# Patient Record
Sex: FEMALE | Race: WHITE | Employment: OTHER | URBAN - METROPOLITAN AREA
[De-identification: names, ages, dates, MRNs, and addresses within clinical notes are randomized per-mention and may not be internally consistent; named-entity substitution may affect disease eponyms.]

---

## 2019-03-06 LAB — PAP SMEAR, EXTERNAL: NORMAL

## 2020-08-21 VITALS
DIASTOLIC BLOOD PRESSURE: 68 MMHG | HEIGHT: 63 IN | BODY MASS INDEX: 30.83 KG/M2 | SYSTOLIC BLOOD PRESSURE: 118 MMHG | WEIGHT: 174 LBS

## 2020-08-21 PROBLEM — E78.00 HYPERCHOLESTEROLEMIA: Status: ACTIVE | Noted: 2020-08-21

## 2020-08-21 PROBLEM — Z78.0 MENOPAUSE: Status: ACTIVE | Noted: 2020-08-21

## 2020-08-21 PROBLEM — I10 HYPERTENSIVE DISORDER: Status: ACTIVE | Noted: 2020-08-21

## 2020-08-21 PROBLEM — R25.1 TREMOR: Status: ACTIVE | Noted: 2020-08-21

## 2020-08-21 PROBLEM — M85.80 OSTEOPENIA: Status: ACTIVE | Noted: 2020-08-21

## 2020-08-21 RX ORDER — FLUOCINONIDE 0.5 MG/G
CREAM TOPICAL 2 TIMES DAILY
COMMUNITY

## 2020-08-21 RX ORDER — ALPRAZOLAM 0.25 MG/1
TABLET ORAL
COMMUNITY

## 2020-08-21 RX ORDER — ESTRADIOL 1 MG/1
1 TABLET ORAL DAILY
COMMUNITY
End: 2021-06-18

## 2020-08-21 RX ORDER — BACLOFEN 10 MG/1
TABLET ORAL 3 TIMES DAILY
COMMUNITY
End: 2021-06-29 | Stop reason: ALTCHOICE

## 2020-08-21 RX ORDER — PRIMIDONE 50 MG/1
TABLET ORAL 3 TIMES DAILY
COMMUNITY

## 2020-08-21 RX ORDER — MUPIROCIN CALCIUM 20 MG/G
CREAM TOPICAL 2 TIMES DAILY
COMMUNITY
End: 2020-10-06

## 2020-08-21 RX ORDER — OFLOXACIN 3 MG/ML
3 SOLUTION/ DROPS OPHTHALMIC 4 TIMES DAILY
COMMUNITY
End: 2021-06-29 | Stop reason: ALTCHOICE

## 2020-08-21 RX ORDER — METRONIDAZOLE 500 MG/1
TABLET ORAL 3 TIMES DAILY
COMMUNITY
End: 2021-06-29 | Stop reason: ALTCHOICE

## 2020-08-21 RX ORDER — PROPRANOLOL HYDROCHLORIDE 10 MG/1
TABLET ORAL 3 TIMES DAILY
COMMUNITY

## 2020-08-21 RX ORDER — TRIAMTERENE/HYDROCHLOROTHIAZID 37.5-25 MG
TABLET ORAL DAILY
COMMUNITY

## 2020-09-16 ENCOUNTER — OFFICE VISIT (OUTPATIENT)
Dept: PODIATRY | Age: 75
End: 2020-09-16
Payer: MEDICARE

## 2020-09-16 VITALS
HEIGHT: 63 IN | SYSTOLIC BLOOD PRESSURE: 125 MMHG | WEIGHT: 174.8 LBS | HEART RATE: 84 BPM | OXYGEN SATURATION: 95 % | BODY MASS INDEX: 30.97 KG/M2 | TEMPERATURE: 98 F | DIASTOLIC BLOOD PRESSURE: 83 MMHG

## 2020-09-16 DIAGNOSIS — L03.032 PARONYCHIA OF GREAT TOE OF LEFT FOOT: Primary | ICD-10-CM

## 2020-09-16 PROCEDURE — G8536 NO DOC ELDER MAL SCRN: HCPCS | Performed by: PODIATRIST

## 2020-09-16 PROCEDURE — G8427 DOCREV CUR MEDS BY ELIG CLIN: HCPCS | Performed by: PODIATRIST

## 2020-09-16 PROCEDURE — G8400 PT W/DXA NO RESULTS DOC: HCPCS | Performed by: PODIATRIST

## 2020-09-16 PROCEDURE — 1101F PT FALLS ASSESS-DOCD LE1/YR: CPT | Performed by: PODIATRIST

## 2020-09-16 PROCEDURE — 11730 AVULSION NAIL PLATE SIMPLE 1: CPT | Performed by: PODIATRIST

## 2020-09-16 PROCEDURE — 3017F COLORECTAL CA SCREEN DOC REV: CPT | Performed by: PODIATRIST

## 2020-09-16 PROCEDURE — 1090F PRES/ABSN URINE INCON ASSESS: CPT | Performed by: PODIATRIST

## 2020-09-16 PROCEDURE — G8419 CALC BMI OUT NRM PARAM NOF/U: HCPCS | Performed by: PODIATRIST

## 2020-09-16 PROCEDURE — G8432 DEP SCR NOT DOC, RNG: HCPCS | Performed by: PODIATRIST

## 2020-09-16 PROCEDURE — 99203 OFFICE O/P NEW LOW 30 MIN: CPT | Performed by: PODIATRIST

## 2020-09-16 RX ORDER — FLUTICASONE PROPIONATE 50 MCG
2 SPRAY, SUSPENSION (ML) NASAL DAILY
COMMUNITY

## 2020-09-16 RX ORDER — LORATADINE 10 MG/1
10 TABLET ORAL
COMMUNITY

## 2020-09-16 RX ORDER — CALCIUM CARBONATE/VITAMIN D3 600 MG-125
TABLET ORAL
COMMUNITY

## 2020-09-16 RX ORDER — ASPIRIN 81 MG/1
TABLET ORAL DAILY
COMMUNITY

## 2020-09-16 NOTE — PROGRESS NOTES
Podiatry History and Physical    Subjective:         Patient is a 69 y.o. female who is being seen as a new pt for a possible infection to her left big toe. Patient states roughly 1 year ago she injured the toe while moving her refrigerator. She states that since, her nail has been growing improperly and she states there has been drainage since the injury. She currently admits to pain rising to the level of 5 out of 10 that is exacerbated with weightbearing and pressure. She states there is clear drainage with no malodor noted. She denies any systemic signs of infection. She states she is never been on antibiotics or had any procedures performed to the area. She denies any other lower extremity complaints    Past Medical History:   Diagnosis Date    Hypercholesterolemia 8/21/2020    Hypertensive disorder 8/21/2020    Lactose intolerance     Menopause 8/21/2020    Obesity     Osteopenia 8/21/2020    Tremor 8/21/2020     Past Surgical History:   Procedure Laterality Date    HX BREAST LUMPECTOMY      HX CHOLECYSTECTOMY      HX COLONOSCOPY      HX HYSTERECTOMY         Family History   Problem Relation Age of Onset    Heart Disease Mother     Hypertension Mother     Diabetes Sister       Social History     Tobacco Use    Smoking status: Former Smoker     Packs/day: 0.50     Years: 20.00     Pack years: 10.00    Smokeless tobacco: Never Used   Substance Use Topics    Alcohol use: Not on file     Allergies   Allergen Reactions    Codeine Unknown (comments)    Opioids - Morphine Analogues Unknown (comments)    Propoxyphene Unknown (comments)     Prior to Admission medications    Medication Sig Start Date End Date Taking? Authorizing Provider   aspirin delayed-release 81 mg tablet Take  by mouth daily. Yes Provider, Historical   calcium-cholecalciferol, d3, (CALCIUM 600 + D) 600-125 mg-unit tab Take  by mouth. Yes Provider, Historical   potassium 99 mg tablet Take 99 mg by mouth daily.    Yes Provider, Historical   loratadine (Claritin) 10 mg tablet Take 10 mg by mouth. Yes Provider, Historical   Lactobacillus acidophilus (PROBIOTIC ACIDOPHILUS PO) Take  by mouth. Yes Provider, Historical   fluticasone propionate (Flonase Allergy Relief) 50 mcg/actuation nasal spray 2 Sprays by Both Nostrils route daily. Yes Provider, Historical   ALPRAZolam (XANAX) 0.25 mg tablet Take  by mouth. Yes Provider, Historical   estradioL (ESTRACE) 1 mg tablet Take 1 mg by mouth daily. Yes Provider, Historical   primidone (MYSOLINE) 50 mg tablet Take  by mouth three (3) times daily. Yes Provider, Historical   propranoloL (INDERAL) 10 mg tablet Take  by mouth three (3) times daily. Yes Provider, Historical   triamterene-hydroCHLOROthiazide (MAXZIDE) 37.5-25 mg per tablet Take  by mouth daily. Yes Provider, Historical   baclofen (LIORESAL) 10 mg tablet Take  by mouth three (3) times daily. Provider, Historical   fluocinoNIDE (LIDEX) 0.05 % topical cream Apply  to affected area two (2) times a day. Provider, Historical   metroNIDAZOLE (FLAGYL) 500 mg tablet Take  by mouth three (3) times daily. Provider, Historical   mupirocin calcium (BACTROBAN) 2 % topical cream Apply  to affected area two (2) times a day. Provider, Historical   ofloxacin (FLOXIN) 0.3 % ophthalmic solution Administer 3 Drops to both eyes four (4) times daily. Provider, Historical       Review of Systems   Constitutional: Negative. HENT: Negative. Eyes: Negative. Respiratory: Negative. Cardiovascular: Negative. Gastrointestinal: Negative. Genitourinary: Negative. Musculoskeletal: Negative. Skin: Positive for wound. Allergic/Immunologic: Positive for environmental allergies. Neurological: Negative. Hematological: Negative. Psychiatric/Behavioral: Positive for dysphoric mood. All other systems reviewed and are negative.       Objective:     Visit Vitals  /83 (BP 1 Location: Left arm, BP Patient Position: Sitting)   Pulse 84   Temp 98 °F (36.7 °C) (Temporal)   Ht 5' 3\" (1.6 m)   Wt 174 lb 12.8 oz (79.3 kg)   SpO2 95%   BMI 30.96 kg/m²       Physical Exam  Vitals signs reviewed. Constitutional:       Appearance: She is obese. Cardiovascular:      Pulses:           Dorsalis pedis pulses are 2+ on the right side and 2+ on the left side. Posterior tibial pulses are 2+ on the right side and 2+ on the left side. Musculoskeletal:      Right lower leg: No edema. Left lower leg: No edema. Right foot: Normal range of motion. No deformity. Left foot: Decreased range of motion. Deformity present. Feet:      Right foot:      Protective Sensation: 10 sites tested. 10 sites sensed. Skin integrity: Skin integrity normal.      Toenail Condition: Right toenails are normal.      Left foot:      Protective Sensation: 10 sites tested. 10 sites sensed. Skin integrity: Ulcer and erythema present. Toenail Condition: Left toenails are ingrown. Skin:     General: Skin is warm. Capillary Refill: Capillary refill takes 2 to 3 seconds. Neurological:      Mental Status: She is alert and oriented to person, place, and time. Psychiatric:         Mood and Affect: Mood and affect normal.         Behavior: Behavior is cooperative. Data Review: No results found for this or any previous visit (from the past 24 hour(s)). Impression:       ICD-10-CM ICD-9-CM    1. Paronychia of great toe of left foot  L03.032 681.11          Recommendation:     Patient seen and evaluated in the office  Discussed and educated patient regarding her current medical condition. Instructed regarding treatment options and patient elected for a partial nail avulsion of the affected toenail  The left hallux was scrubbed and draped in sterile fashion and the offending nail border was removed without incident. No anesthesia was needed.   Patient tolerated well and appropriate dressing was applied  Patient instructed to perform Epsom salt and warm water soaks for 15 minutes each day  Patient to call the office immediately if condition worsens and will initiate antibiotic therapy        RTC in PRN

## 2020-09-24 ENCOUNTER — OFFICE VISIT (OUTPATIENT)
Dept: PODIATRY | Age: 75
End: 2020-09-24
Payer: MEDICARE

## 2020-09-24 VITALS
HEIGHT: 63 IN | WEIGHT: 175.8 LBS | TEMPERATURE: 97.1 F | HEART RATE: 91 BPM | DIASTOLIC BLOOD PRESSURE: 68 MMHG | SYSTOLIC BLOOD PRESSURE: 105 MMHG | BODY MASS INDEX: 31.15 KG/M2 | OXYGEN SATURATION: 98 %

## 2020-09-24 DIAGNOSIS — L03.032 PARONYCHIA OF GREAT TOE OF LEFT FOOT: Primary | ICD-10-CM

## 2020-09-24 PROCEDURE — 99213 OFFICE O/P EST LOW 20 MIN: CPT | Performed by: PODIATRIST

## 2020-09-24 RX ORDER — DOXYCYCLINE 100 MG/1
100 CAPSULE ORAL 2 TIMES DAILY
Qty: 20 CAP | Refills: 0 | Status: SHIPPED | OUTPATIENT
Start: 2020-09-24 | End: 2020-10-04

## 2020-09-24 NOTE — PROGRESS NOTES
Podiatry History and Physical    Subjective:         Patient is a 69 y.o. female who is being seen as a returning patient status post partial nail removal to the left big toe. Patient states she has been performing wound care as instructed. She states she has been soaking her foot as instructed. She admits to pain rising to the level of 3 out of 10 that is exacerbated with weightbearing and pressure. She states there is mild clear drainage with no malodor. She denies any systemic signs of infection. She denies any other lower extremity complaints    Past Medical History:   Diagnosis Date    Hypercholesterolemia 8/21/2020    Hypertensive disorder 8/21/2020    Lactose intolerance     Menopause 8/21/2020    Obesity     Osteopenia 8/21/2020    Tremor 8/21/2020     Past Surgical History:   Procedure Laterality Date    HX BREAST LUMPECTOMY      HX CHOLECYSTECTOMY      HX COLONOSCOPY      HX HYSTERECTOMY         Family History   Problem Relation Age of Onset    Heart Disease Mother     Hypertension Mother     Diabetes Sister       Social History     Tobacco Use    Smoking status: Former Smoker     Packs/day: 0.50     Years: 20.00     Pack years: 10.00    Smokeless tobacco: Never Used   Substance Use Topics    Alcohol use: Never     Frequency: Never     Allergies   Allergen Reactions    Codeine Unknown (comments)    Opioids - Morphine Analogues Unknown (comments)    Propoxyphene Unknown (comments)     Prior to Admission medications    Medication Sig Start Date End Date Taking? Authorizing Provider   aspirin delayed-release 81 mg tablet Take  by mouth daily. Yes Provider, Historical   calcium-cholecalciferol, d3, (CALCIUM 600 + D) 600-125 mg-unit tab Take  by mouth. Yes Provider, Historical   potassium 99 mg tablet Take 99 mg by mouth daily. Yes Provider, Historical   loratadine (Claritin) 10 mg tablet Take 10 mg by mouth.    Yes Provider, Historical   Lactobacillus acidophilus (PROBIOTIC ACIDOPHILUS PO) Take  by mouth. Yes Provider, Historical   fluticasone propionate (Flonase Allergy Relief) 50 mcg/actuation nasal spray 2 Sprays by Both Nostrils route daily. Yes Provider, Historical   ALPRAZolam (XANAX) 0.25 mg tablet Take  by mouth. Yes Provider, Historical   baclofen (LIORESAL) 10 mg tablet Take  by mouth three (3) times daily. Yes Provider, Historical   estradioL (ESTRACE) 1 mg tablet Take 1 mg by mouth daily. Yes Provider, Historical   fluocinoNIDE (LIDEX) 0.05 % topical cream Apply  to affected area two (2) times a day. Yes Provider, Historical   metroNIDAZOLE (FLAGYL) 500 mg tablet Take  by mouth three (3) times daily. Yes Provider, Historical   mupirocin calcium (BACTROBAN) 2 % topical cream Apply  to affected area two (2) times a day. Yes Provider, Historical   ofloxacin (FLOXIN) 0.3 % ophthalmic solution Administer 3 Drops to both eyes four (4) times daily. Yes Provider, Historical   primidone (MYSOLINE) 50 mg tablet Take  by mouth three (3) times daily. Yes Provider, Historical   propranoloL (INDERAL) 10 mg tablet Take  by mouth three (3) times daily. Yes Provider, Historical   triamterene-hydroCHLOROthiazide (MAXZIDE) 37.5-25 mg per tablet Take  by mouth daily. Yes Provider, Historical       Review of Systems   Constitutional: Negative. HENT: Negative. Eyes: Negative. Respiratory: Negative. Cardiovascular: Negative. Gastrointestinal: Negative. Genitourinary: Negative. Musculoskeletal: Negative. Skin: Positive for wound. Allergic/Immunologic: Positive for environmental allergies. Neurological: Negative. Hematological: Negative. Psychiatric/Behavioral: Positive for dysphoric mood. All other systems reviewed and are negative.       Objective:     Visit Vitals  /68 (BP 1 Location: Left arm, BP Patient Position: Sitting)   Pulse 91   Temp 97.1 °F (36.2 °C) (Temporal)   Ht 5' 3\" (1.6 m)   Wt 175 lb 12.8 oz (79.7 kg)   SpO2 98% BMI 31.14 kg/m²       Physical Exam  Vitals signs reviewed. Constitutional:       Appearance: She is obese. Cardiovascular:      Pulses:           Dorsalis pedis pulses are 2+ on the right side and 2+ on the left side. Posterior tibial pulses are 2+ on the right side and 2+ on the left side. Musculoskeletal:      Right lower leg: No edema. Left lower leg: No edema. Right foot: Normal range of motion. No deformity. Left foot: Decreased range of motion. Deformity present. Feet:      Right foot:      Protective Sensation: 10 sites tested. 10 sites sensed. Skin integrity: Skin integrity normal.      Toenail Condition: Right toenails are normal.      Left foot:      Protective Sensation: 10 sites tested. 10 sites sensed. Skin integrity: Ulcer and erythema present. Toenail Condition: Left toenails are ingrown. Skin:     General: Skin is warm. Capillary Refill: Capillary refill takes 2 to 3 seconds. Neurological:      Mental Status: She is alert and oriented to person, place, and time. Psychiatric:         Mood and Affect: Mood and affect normal.         Behavior: Behavior is cooperative. Data Review: No results found for this or any previous visit (from the past 24 hour(s)). Impression:     -Paronychia, left hallux    Recommendation:     Patient seen and evaluated in the office  Discussed and educated patient regarding her current medical condition. Wound care performed and updated orders given.   Patient given a prescription for doxycycline 100 mg to be taken twice daily for the next 10 days  Patient instructed to perform Epsom salt and warm water soaks for 15 minutes each day  Patient to call the office immediately if condition worsens and will initiate antibiotic therapy        RTC in 1 week

## 2020-10-06 ENCOUNTER — TELEPHONE (OUTPATIENT)
Dept: PODIATRY | Age: 75
End: 2020-10-06

## 2020-10-06 ENCOUNTER — OFFICE VISIT (OUTPATIENT)
Dept: PRIMARY CARE CLINIC | Age: 75
End: 2020-10-06
Payer: MEDICARE

## 2020-10-06 VITALS
HEART RATE: 98 BPM | OXYGEN SATURATION: 98 % | SYSTOLIC BLOOD PRESSURE: 112 MMHG | TEMPERATURE: 97.4 F | DIASTOLIC BLOOD PRESSURE: 64 MMHG

## 2020-10-06 DIAGNOSIS — L03.032 INFECTION OF NAIL BED OF TOE OF LEFT FOOT: ICD-10-CM

## 2020-10-06 DIAGNOSIS — Z13.83 SCREENING FOR CARDIOVASCULAR, RESPIRATORY, AND GENITOURINARY DISEASES: Primary | ICD-10-CM

## 2020-10-06 DIAGNOSIS — Z13.6 SCREENING FOR CARDIOVASCULAR, RESPIRATORY, AND GENITOURINARY DISEASES: Primary | ICD-10-CM

## 2020-10-06 DIAGNOSIS — R06.02 SHORTNESS OF BREATH: ICD-10-CM

## 2020-10-06 DIAGNOSIS — Z13.89 SCREENING FOR CARDIOVASCULAR, RESPIRATORY, AND GENITOURINARY DISEASES: Primary | ICD-10-CM

## 2020-10-06 DIAGNOSIS — R05.9 COUGH: ICD-10-CM

## 2020-10-06 PROCEDURE — 99213 OFFICE O/P EST LOW 20 MIN: CPT | Performed by: NURSE PRACTITIONER

## 2020-10-06 RX ORDER — MUPIROCIN 20 MG/G
OINTMENT TOPICAL
Qty: 22 G | Refills: 0 | Status: SHIPPED | OUTPATIENT
Start: 2020-10-06 | End: 2021-06-29 | Stop reason: ALTCHOICE

## 2020-10-06 NOTE — PROGRESS NOTES
Nikhil Barber is a 69 y.o. female who was seen in clinic today (10/6/2020) for an acute visit. Assessment/Plan:            * COVID-19 sample collected and submitted       * Patient given detailed CDC instructions contained within After Visit Summary    Diagnoses and all orders for this visit:    1. Screening for cardiovascular, respiratory, and genitourinary diseases  -     NOVEL CORONAVIRUS (COVID-19)    2. Cough    3. Shortness of breath    4. Infection of nail bed of toe of left foot  -     mupirocin (BACTROBAN) 2 % ointment; Apply to affected toe twice daily     advised continued soak of foot, application of abx cream to area what may be a mild localized infection, and/or a mix of the other cream she has been applying to the site. F/u with Dr. Magy Shane for any additional issues. Unlikely covid, probable allergic rhinitis, cont using flonase and f/u with pcp prn. Advised pt on CDC guidance, OTC medications for symptom management, red flags reviewed and should develop to seek emergency medical attn. Encouraged pt to maintain health through a balanced diet, high in fiber and plants;small freq meals if any nausea; staying well hydrated by drinking water or occ low sugar non carbonated beverages; reviewed importance of proper sleep habitus, 7-8hrs of rest; and emphasized moderate exercise 30 mins a day/150mins a week. Reviewed with her that COVID-19 pandemic is an evolving situation with rapidly changing recommendations & guidelines, continue to practice hand hygiene, social distancing, wearing of facial coverings. Regardless of testing results, should still follow CDC guidelines as there is a chance of a false negative, or symptoms may be due to a cold or flu which are also transmissible. Medical decisions are made based on the the best information available at the time.   Recommended she stay tuned for updates published by trusted sources and to advise your PCP of any unexpected changes in clinical condition     Subjective:   Elena Adams was seen today for Concern For COVID-19 (Coronavirus) (Patient in office with c/o new onset cough (2 days), chronic SOB, and chronic fatigue, as well as nasal congestion. No known COVID exposure. ); Follow-up; Cough; Shortness of Breath; Fatigue; and Nasal Congestion    She c/o of cough for past few days, it is dry intermittent and occurs randomly throughout the day, she believes it be due to allergies. She notes chronic SOB and fatigue. She is a former smoker 10pack years, her pulmonologist dx her with chronic bronchitis and advised her to lose weight. She denies a recent history/current: fever, loss of smell/taste, n/v/d, myalgia. She recently had some lesions removed from the great toe of her left foot. She was concerned about an infection, her podiatrist put her on doxycycline for 10 days and today is the last day. She was to f/u with him but due to new cough, was advised a negative covid 19 test first.  The toe seems to be improving in her opinion, and the mid foot pain she had resolved with abx. Its not bothering her, but she wanted to be sure it was healing correctly. Travel Screening     Question   Response    In the last month, have you been in contact with someone who was confirmed or suspected to have Coronavirus / COVID-19? No / Unsure    Have you had a COVID-19 viral test in the last 14 days? Do you have any of the following new or worsening symptoms? Cough; Shortness of breath;Weakness    Have you traveled internationally in the last month?   No      Travel History   Travel since 09/06/20     No documented travel since 09/06/20          ROS - Pertinent items are noted in HPI    Patient Active Problem List   Diagnosis Code    Hypercholesterolemia E78.00    Hypertensive disorder I10    Menopause Z78.0    Osteopenia M85.80    Tremor R25.1    Paronychia of great toe of left foot L03.032     Home Medications    Medication Sig Start Date End Date Taking? Authorizing Provider   mupirocin (BACTROBAN) 2 % ointment Apply to affected toe twice daily 10/6/20  Yes Devan Kothari NP   aspirin delayed-release 81 mg tablet Take  by mouth daily. Yes Provider, Historical   calcium-cholecalciferol, d3, (CALCIUM 600 + D) 600-125 mg-unit tab Take  by mouth. Yes Provider, Historical   potassium 99 mg tablet Take 99 mg by mouth daily. Yes Provider, Historical   loratadine (Claritin) 10 mg tablet Take 10 mg by mouth. Yes Provider, Historical   Lactobacillus acidophilus (PROBIOTIC ACIDOPHILUS PO) Take  by mouth. Yes Provider, Historical   ALPRAZolam (XANAX) 0.25 mg tablet Take  by mouth. Yes Provider, Historical   estradioL (ESTRACE) 1 mg tablet Take 1 mg by mouth daily. Yes Provider, Historical   primidone (MYSOLINE) 50 mg tablet Take  by mouth three (3) times daily. Yes Provider, Historical   propranoloL (INDERAL) 10 mg tablet Take  by mouth three (3) times daily. Yes Provider, Historical   fluticasone propionate (Flonase Allergy Relief) 50 mcg/actuation nasal spray 2 Sprays by Both Nostrils route daily. Provider, Historical   baclofen (LIORESAL) 10 mg tablet Take  by mouth three (3) times daily. Provider, Historical   fluocinoNIDE (LIDEX) 0.05 % topical cream Apply  to affected area two (2) times a day. Provider, Historical   metroNIDAZOLE (FLAGYL) 500 mg tablet Take  by mouth three (3) times daily. Provider, Historical   ofloxacin (FLOXIN) 0.3 % ophthalmic solution Administer 3 Drops to both eyes four (4) times daily. Provider, Historical   triamterene-hydroCHLOROthiazide (MAXZIDE) 37.5-25 mg per tablet Take  by mouth daily. Provider, Historical   mupirocin calcium (BACTROBAN) 2 % topical cream Apply  to affected area two (2) times a day.   10/6/20  Provider, Historical      Allergies   Allergen Reactions    Codeine Unknown (comments)    Opioids - Morphine Analogues Unknown (comments)    Propoxyphene Unknown (comments) Objective:   Physical Exam  Musculoskeletal:        Feet:    Feet:      Comments: Distal to the eponychium of the left great toe is a 3-5mm opening in the nail, there is scant purulent drainage likely mixed with silver cream pt has been applying, doesn't appear to be any tunneling or eschar. The toe is mildly erythematous, is not swollen, no red streaking, no TTP, warm but not hot to touch. General:  obese, alert, cooperative, mild sob   Ears: Normal external ear canals AU   Sinuses: Normal paranasal sinuses without tenderness   Mouth:  Lips, mucosa, and tongue normal. Teeth and gums normal: oropharynx: clear   Neck: supple, symmetrical, trachea midline. Heart: normal rate, regular rhythm, normal S1, S2, no murmurs, rubs, clicks or gallops. Lungs: clear to auscultation bilaterally, appears slightly dyspneic       Visit Vitals  /64 (BP 1 Location: Left arm, BP Patient Position: Sitting)   Pulse 98   Temp 97.4 °F (36.3 °C)   SpO2 98%         Disclaimer:        Medication risks/benefits/costs/interactions/alternatives discussed with patient. She was given an after visit summary which includes diagnoses, current medications, & vitals. She expressed understanding with the diagnosis and plan. Aspects of this note may have been generated using voice recognition software. Despite editing, there may be some syntax errors.        Rachana Gutiérrez NP

## 2020-10-06 NOTE — TELEPHONE ENCOUNTER
The pt called earlier and spoke to [place] about having possible COVID symptoms but also a possible infection in her toe too from where he took off her nail at her last visit. I called the pt back and spoke to her about her COVID symptoms. She states that she has been really tired the last two weeks hardly able t get out of bed. She had no fever or SOB but she was experiencing a cough. She states that she called her PCP this morning, Dr. Irene Torre and that he prescribed her a cough medication, an antibiotic, and told her that she should get COVID tested. Pt didn't know where she could go besides [facility] but they wanted her to make an appointment. I also asked her about her toe. She says that it is still bleeding and that she was soaking it like the doctor had told her too. There were no signs of pus or odor coming from the site. I discussed the issue with Dr. Lee Gillette and our practice manager, Ivette Brand. It was decided that she should go to the flu clinic across the street from us and after she had been tested, Dr. Lee Gillette would go over there to see her toe.

## 2020-10-07 LAB — SARS-COV-2, NAA: NOT DETECTED

## 2020-10-09 ENCOUNTER — TELEPHONE (OUTPATIENT)
Dept: PRIMARY CARE CLINIC | Age: 75
End: 2020-10-09

## 2020-10-26 ENCOUNTER — OFFICE VISIT (OUTPATIENT)
Dept: PODIATRY | Age: 75
End: 2020-10-26
Payer: MEDICARE

## 2020-10-26 VITALS
OXYGEN SATURATION: 97 % | HEART RATE: 90 BPM | HEIGHT: 63 IN | SYSTOLIC BLOOD PRESSURE: 127 MMHG | WEIGHT: 178.8 LBS | TEMPERATURE: 97.5 F | BODY MASS INDEX: 31.68 KG/M2 | DIASTOLIC BLOOD PRESSURE: 86 MMHG

## 2020-10-26 DIAGNOSIS — L03.032 PARONYCHIA OF GREAT TOE OF LEFT FOOT: Primary | ICD-10-CM

## 2020-10-26 PROCEDURE — 99212 OFFICE O/P EST SF 10 MIN: CPT | Performed by: PODIATRIST

## 2020-10-26 RX ORDER — BENZONATATE 100 MG/1
100 CAPSULE ORAL
COMMUNITY
End: 2021-06-29 | Stop reason: ALTCHOICE

## 2020-10-29 NOTE — PROGRESS NOTES
Podiatry History and Physical    Subjective:         Patient is a 77 y.o. female who is being seen as a returning patient status post partial nail removal to the left big toe. Patient states since her last visit she presented for evaluation regarding a possible COVID-19 infection. She states the physician gave her oral antibiotics which she is continued to take. She states she has noticed improvement in the erythema noted to the left big toe. She currently denies any drainage or pain. She denies any recent trauma. She denies any systemic signs infection. She denies any other pedal complaints    Past Medical History:   Diagnosis Date    Hypercholesterolemia 2020    Hypertensive disorder 2020    Lactose intolerance     Menopause 2020    Obesity     Osteopenia 2020    Tremor 2020     Past Surgical History:   Procedure Laterality Date    HX BREAST LUMPECTOMY      HX CHOLECYSTECTOMY      HX COLONOSCOPY      HX HYSTERECTOMY         Family History   Problem Relation Age of Onset    Heart Disease Mother     Hypertension Mother     Diabetes Sister       Social History     Tobacco Use    Smoking status: Former Smoker     Packs/day: 0.50     Years: 20.00     Pack years: 10.00     Last attempt to quit: 1997     Years since quittin.8    Smokeless tobacco: Never Used   Substance Use Topics    Alcohol use: Never     Frequency: Never     Allergies   Allergen Reactions    Codeine Unknown (comments)    Opioids - Morphine Analogues Unknown (comments)    Propoxyphene Unknown (comments)     Prior to Admission medications    Medication Sig Start Date End Date Taking? Authorizing Provider   benzonatate (TESSALON) 100 mg capsule Take 100 mg by mouth three (3) times daily as needed for Cough. Yes Provider, Historical   mupirocin (BACTROBAN) 2 % ointment Apply to affected toe twice daily 10/6/20  Yes Teagan Cope NP   aspirin delayed-release 81 mg tablet Take  by mouth daily. Yes Provider, Historical   calcium-cholecalciferol, d3, (CALCIUM 600 + D) 600-125 mg-unit tab Take  by mouth. Yes Provider, Historical   potassium 99 mg tablet Take 99 mg by mouth daily. Yes Provider, Historical   loratadine (Claritin) 10 mg tablet Take 10 mg by mouth. Yes Provider, Historical   Lactobacillus acidophilus (PROBIOTIC ACIDOPHILUS PO) Take  by mouth. Yes Provider, Historical   fluticasone propionate (Flonase Allergy Relief) 50 mcg/actuation nasal spray 2 Sprays by Both Nostrils route daily. Yes Provider, Historical   ALPRAZolam (XANAX) 0.25 mg tablet Take  by mouth. Yes Provider, Historical   baclofen (LIORESAL) 10 mg tablet Take  by mouth three (3) times daily. Yes Provider, Historical   estradioL (ESTRACE) 1 mg tablet Take 1 mg by mouth daily. Yes Provider, Historical   fluocinoNIDE (LIDEX) 0.05 % topical cream Apply  to affected area two (2) times a day. Yes Provider, Historical   metroNIDAZOLE (FLAGYL) 500 mg tablet Take  by mouth three (3) times daily. Yes Provider, Historical   ofloxacin (FLOXIN) 0.3 % ophthalmic solution Administer 3 Drops to both eyes four (4) times daily. Yes Provider, Historical   primidone (MYSOLINE) 50 mg tablet Take  by mouth three (3) times daily. Yes Provider, Historical   propranoloL (INDERAL) 10 mg tablet Take  by mouth three (3) times daily. Yes Provider, Historical   triamterene-hydroCHLOROthiazide (MAXZIDE) 37.5-25 mg per tablet Take  by mouth daily. Yes Provider, Historical       Review of Systems   Constitutional: Negative. HENT: Negative. Eyes: Negative. Respiratory: Negative. Cardiovascular: Negative. Gastrointestinal: Negative. Genitourinary: Negative. Musculoskeletal: Negative. Skin: Positive for wound. Allergic/Immunologic: Positive for environmental allergies. Neurological: Negative. Hematological: Negative. Psychiatric/Behavioral: Positive for dysphoric mood.    All other systems reviewed and are negative. Objective:     Visit Vitals  /86 (BP 1 Location: Right arm, BP Patient Position: Sitting)   Pulse 90   Temp 97.5 °F (36.4 °C) (Temporal)   Ht 5' 3\" (1.6 m)   Wt 178 lb 12.8 oz (81.1 kg)   SpO2 97%   BMI 31.67 kg/m²       Physical Exam  Vitals signs reviewed. Constitutional:       Appearance: She is obese. Cardiovascular:      Pulses:           Dorsalis pedis pulses are 2+ on the right side and 2+ on the left side. Posterior tibial pulses are 2+ on the right side and 2+ on the left side. Pulmonary:      Effort: Pulmonary effort is normal.   Musculoskeletal:      Right lower leg: No edema. Left lower leg: No edema. Right foot: Normal range of motion. No deformity. Left foot: Decreased range of motion. Deformity present. Feet:      Right foot:      Protective Sensation: 10 sites tested. 10 sites sensed. Skin integrity: Skin integrity normal.      Toenail Condition: Right toenails are normal.      Left foot:      Protective Sensation: 10 sites tested. 10 sites sensed. Skin integrity: Erythema present. Toenail Condition: Left toenails are normal.   Lymphadenopathy:      Lower Body: No right inguinal adenopathy. No left inguinal adenopathy. Skin:     General: Skin is warm. Capillary Refill: Capillary refill takes 2 to 3 seconds. Neurological:      Mental Status: She is alert and oriented to person, place, and time. Psychiatric:         Mood and Affect: Mood and affect normal.         Behavior: Behavior is cooperative. Data Review: No results found for this or any previous visit (from the past 24 hour(s)). Impression:     - Paronychia, left hallux    Recommendation:     Patient seen and evaluated in the office  Discussed and educated patient regarding her current medical condition. Area appears to have healed.   Patient to complete her oral antibiotics and continue with Epsom salt and warm water soaks for 15 minutes each day for the next 7 days.   Patient to monitor and call the office immediately if condition worsens

## 2021-02-08 ENCOUNTER — OFFICE VISIT (OUTPATIENT)
Dept: PODIATRY | Age: 76
End: 2021-02-08
Payer: MEDICARE

## 2021-02-08 VITALS
HEART RATE: 87 BPM | OXYGEN SATURATION: 99 % | TEMPERATURE: 96.8 F | SYSTOLIC BLOOD PRESSURE: 131 MMHG | DIASTOLIC BLOOD PRESSURE: 90 MMHG | BODY MASS INDEX: 31.47 KG/M2 | HEIGHT: 63 IN | WEIGHT: 177.6 LBS

## 2021-02-08 DIAGNOSIS — L60.0 INGROWN TOENAIL OF LEFT FOOT: Primary | ICD-10-CM

## 2021-02-08 PROCEDURE — G8400 PT W/DXA NO RESULTS DOC: HCPCS | Performed by: PODIATRIST

## 2021-02-08 PROCEDURE — G8752 SYS BP LESS 140: HCPCS | Performed by: PODIATRIST

## 2021-02-08 PROCEDURE — G8417 CALC BMI ABV UP PARAM F/U: HCPCS | Performed by: PODIATRIST

## 2021-02-08 PROCEDURE — G8755 DIAS BP > OR = 90: HCPCS | Performed by: PODIATRIST

## 2021-02-08 PROCEDURE — 3017F COLORECTAL CA SCREEN DOC REV: CPT | Performed by: PODIATRIST

## 2021-02-08 PROCEDURE — G8427 DOCREV CUR MEDS BY ELIG CLIN: HCPCS | Performed by: PODIATRIST

## 2021-02-08 PROCEDURE — G8432 DEP SCR NOT DOC, RNG: HCPCS | Performed by: PODIATRIST

## 2021-02-08 PROCEDURE — 99213 OFFICE O/P EST LOW 20 MIN: CPT | Performed by: PODIATRIST

## 2021-02-08 PROCEDURE — G8536 NO DOC ELDER MAL SCRN: HCPCS | Performed by: PODIATRIST

## 2021-02-08 PROCEDURE — 1090F PRES/ABSN URINE INCON ASSESS: CPT | Performed by: PODIATRIST

## 2021-02-08 PROCEDURE — 1101F PT FALLS ASSESS-DOCD LE1/YR: CPT | Performed by: PODIATRIST

## 2021-02-10 PROBLEM — L60.0 INGROWN TOENAIL OF LEFT FOOT: Status: ACTIVE | Noted: 2021-02-10

## 2021-02-10 NOTE — PROGRESS NOTES
Podiatry History and Physical    Subjective:         Patient is a 77 y.o. female who is being seen as a returning patient for cont pain to her left big toe. She states she tolerated the procedure during a previous office visit well but she believes the ingrown toenail has returned. States the pain rises to the level of 8 out of 10 is located to the medial nail border. She states there is redness to the toe without any overt signs of local infection. She denies any systemic signs infection. She denies any recent trauma. She denies any other pedal complaint    Past Medical History:   Diagnosis Date    Hypercholesterolemia 2020    Hypertensive disorder 2020    Lactose intolerance     Menopause 2020    Obesity     Osteopenia 2020    Tremor 2020     Past Surgical History:   Procedure Laterality Date    HX BREAST LUMPECTOMY      HX CHOLECYSTECTOMY      HX COLONOSCOPY      HX HYSTERECTOMY         Family History   Problem Relation Age of Onset    Heart Disease Mother     Hypertension Mother     Diabetes Sister       Social History     Tobacco Use    Smoking status: Former Smoker     Packs/day: 0.50     Years: 20.00     Pack years: 10.00     Quit date: 1997     Years since quittin.1    Smokeless tobacco: Never Used   Substance Use Topics    Alcohol use: Never     Frequency: Never     Allergies   Allergen Reactions    Codeine Unknown (comments)    Opioids - Morphine Analogues Unknown (comments)    Propoxyphene Unknown (comments)     Prior to Admission medications    Medication Sig Start Date End Date Taking? Authorizing Provider   benzonatate (TESSALON) 100 mg capsule Take 100 mg by mouth three (3) times daily as needed for Cough. Yes Provider, Historical   mupirocin (BACTROBAN) 2 % ointment Apply to affected toe twice daily 10/6/20  Yes Nina Gillis NP   aspirin delayed-release 81 mg tablet Take  by mouth daily.    Yes Provider, Historical calcium-cholecalciferol, d3, (CALCIUM 600 + D) 600-125 mg-unit tab Take  by mouth. Yes Provider, Historical   potassium 99 mg tablet Take 99 mg by mouth daily. Yes Provider, Historical   loratadine (Claritin) 10 mg tablet Take 10 mg by mouth. Yes Provider, Historical   Lactobacillus acidophilus (PROBIOTIC ACIDOPHILUS PO) Take  by mouth. Yes Provider, Historical   fluticasone propionate (Flonase Allergy Relief) 50 mcg/actuation nasal spray 2 Sprays by Both Nostrils route daily. Yes Provider, Historical   ALPRAZolam (XANAX) 0.25 mg tablet Take  by mouth. Yes Provider, Historical   baclofen (LIORESAL) 10 mg tablet Take  by mouth three (3) times daily. Yes Provider, Historical   estradioL (ESTRACE) 1 mg tablet Take 1 mg by mouth daily. Yes Provider, Historical   fluocinoNIDE (LIDEX) 0.05 % topical cream Apply  to affected area two (2) times a day. Yes Provider, Historical   metroNIDAZOLE (FLAGYL) 500 mg tablet Take  by mouth three (3) times daily. Yes Provider, Historical   ofloxacin (FLOXIN) 0.3 % ophthalmic solution Administer 3 Drops to both eyes four (4) times daily. Yes Provider, Historical   primidone (MYSOLINE) 50 mg tablet Take  by mouth three (3) times daily. Yes Provider, Historical   propranoloL (INDERAL) 10 mg tablet Take  by mouth three (3) times daily. Yes Provider, Historical   triamterene-hydroCHLOROthiazide (MAXZIDE) 37.5-25 mg per tablet Take  by mouth daily. Yes Provider, Historical       Review of Systems   Constitutional: Negative. HENT: Negative. Eyes: Negative. Respiratory: Negative. Cardiovascular: Negative. Gastrointestinal: Negative. Genitourinary: Negative. Musculoskeletal: Negative. Skin: Positive for wound. Allergic/Immunologic: Positive for environmental allergies. Neurological: Negative. Hematological: Negative. Psychiatric/Behavioral: Positive for dysphoric mood. All other systems reviewed and are negative.       Objective: Visit Vitals  BP (!) 131/90 (BP 1 Location: Left upper arm, BP Patient Position: Sitting, BP Cuff Size: Adult)   Pulse 87   Temp 96.8 °F (36 °C) (Temporal)   Ht 5' 3\" (1.6 m)   Wt 177 lb 9.6 oz (80.6 kg)   SpO2 99%   BMI 31.46 kg/m²       Physical Exam  Vitals signs reviewed. Constitutional:       Appearance: She is obese. Cardiovascular:      Pulses:           Dorsalis pedis pulses are 2+ on the right side and 2+ on the left side. Posterior tibial pulses are 2+ on the right side and 2+ on the left side. Pulmonary:      Effort: Pulmonary effort is normal.   Musculoskeletal:      Right lower leg: No edema. Left lower leg: No edema. Right foot: Normal range of motion. No deformity. Left foot: Decreased range of motion. Deformity present. Feet:      Right foot:      Protective Sensation: 10 sites tested. 10 sites sensed. Skin integrity: Skin integrity normal.      Toenail Condition: Right toenails are normal.      Left foot:      Protective Sensation: 10 sites tested. 10 sites sensed. Skin integrity: Erythema present. Toenail Condition: Left toenails are ingrown. Lymphadenopathy:      Lower Body: No right inguinal adenopathy. No left inguinal adenopathy. Skin:     General: Skin is warm. Capillary Refill: Capillary refill takes 2 to 3 seconds. Neurological:      Mental Status: She is alert and oriented to person, place, and time. Psychiatric:         Mood and Affect: Mood and affect normal.         Behavior: Behavior is cooperative. Data Review: No results found for this or any previous visit (from the past 24 hour(s)). Impression:     - Ingrown toenail, left hallux    Recommendation:     Patient seen and evaluated in the office  Discussed and educated patient regarding her current medical condition. A slant back procedure was performed to the medial nail border of the left hallux with a nail nipper without incident. Patient tolerated well. Instructed patient that she needed to perform Epsom salt and warm water soaks for 15 minutes each day to prevent the ingrown from returning. Also educated patient that she may need a more permanent procedure if the symptoms persist or return.   Patient verbalized understanding

## 2021-03-05 ENCOUNTER — TRANSCRIBE ORDER (OUTPATIENT)
Dept: SCHEDULING | Age: 76
End: 2021-03-05

## 2021-03-05 DIAGNOSIS — I25.10 CHRONIC CORONARY ARTERY DISEASE: Primary | ICD-10-CM

## 2021-03-17 ENCOUNTER — HOSPITAL ENCOUNTER (OUTPATIENT)
Dept: NUCLEAR MEDICINE | Age: 76
Discharge: HOME | End: 2021-03-17
Attending: INTERNAL MEDICINE
Payer: MEDICARE

## 2021-03-17 ENCOUNTER — HOSPITAL ENCOUNTER (OUTPATIENT)
Dept: NON INVASIVE DIAGNOSTICS | Age: 76
Discharge: HOME | End: 2021-03-17
Attending: INTERNAL MEDICINE
Payer: MEDICARE

## 2021-03-17 VITALS
BODY MASS INDEX: 31.36 KG/M2 | WEIGHT: 177 LBS | DIASTOLIC BLOOD PRESSURE: 90 MMHG | HEIGHT: 63 IN | SYSTOLIC BLOOD PRESSURE: 147 MMHG

## 2021-03-17 DIAGNOSIS — I25.10 CHRONIC CORONARY ARTERY DISEASE: ICD-10-CM

## 2021-03-17 PROCEDURE — 74011250636 HC RX REV CODE- 250/636

## 2021-03-17 PROCEDURE — 93017 CV STRESS TEST TRACING ONLY: CPT

## 2021-03-17 RX ORDER — AMINOPHYLLINE 25 MG/ML
INJECTION, SOLUTION INTRAVENOUS
Status: COMPLETED
Start: 2021-03-17 | End: 2021-03-17

## 2021-03-17 RX ADMIN — AMINOPHYLLINE 125 MG: 25 INJECTION, SOLUTION INTRAVENOUS at 09:30

## 2021-03-17 RX ADMIN — REGADENOSON 0.4 MG: 0.08 INJECTION, SOLUTION INTRAVENOUS at 09:27

## 2021-03-18 LAB
STRESS BASELINE DIAS BP: 90 MMHG
STRESS BASELINE HR: 83 BPM
STRESS BASELINE SYS BP: 147 MMHG
STRESS PERCENT HR ACHIEVED: 85 %
STRESS POST PEAK HR: 124 BPM
STRESS STAGE 1 DURATION: NORMAL MIN:SEC
STRESS STAGE 1 HR: 96 BPM
STRESS STAGE 2 BP: NORMAL MMHG
STRESS STAGE 2 DURATION: NORMAL MIN:SEC
STRESS STAGE 2 HR: 113 BPM
STRESS STAGE 3 BP: NORMAL MMHG
STRESS STAGE 3 DURATION: NORMAL MIN:SEC
STRESS STAGE 3 HR: 113 BPM
STRESS STAGE 4 DURATION: NORMAL MIN:SEC
STRESS STAGE 4 HR: 111 BPM
STRESS STAGE 5 BP: NORMAL MMHG
STRESS STAGE 5 DURATION: NORMAL MIN:SEC
STRESS STAGE 5 HR: 100 BPM
STRESS STAGE 6 DURATION: NORMAL MIN:SEC
STRESS STAGE 6 HR: 98 BPM
STRESS STAGE 7 BP: NORMAL MMHG
STRESS STAGE 7 DURATION: NORMAL MIN:SEC
STRESS STAGE 7 HR: 96 BPM
STRESS TARGET HR: 146 BPM

## 2021-03-22 ENCOUNTER — OFFICE VISIT (OUTPATIENT)
Dept: PODIATRY | Age: 76
End: 2021-03-22
Payer: MEDICARE

## 2021-03-22 VITALS
OXYGEN SATURATION: 99 % | HEIGHT: 63 IN | TEMPERATURE: 96.8 F | BODY MASS INDEX: 31.29 KG/M2 | SYSTOLIC BLOOD PRESSURE: 133 MMHG | HEART RATE: 87 BPM | DIASTOLIC BLOOD PRESSURE: 84 MMHG | WEIGHT: 176.6 LBS

## 2021-03-22 DIAGNOSIS — L60.0 INGROWN TOENAIL OF LEFT FOOT: Primary | ICD-10-CM

## 2021-03-22 PROCEDURE — G8400 PT W/DXA NO RESULTS DOC: HCPCS | Performed by: PODIATRIST

## 2021-03-22 PROCEDURE — G8536 NO DOC ELDER MAL SCRN: HCPCS | Performed by: PODIATRIST

## 2021-03-22 PROCEDURE — G8510 SCR DEP NEG, NO PLAN REQD: HCPCS | Performed by: PODIATRIST

## 2021-03-22 PROCEDURE — 99213 OFFICE O/P EST LOW 20 MIN: CPT | Performed by: PODIATRIST

## 2021-03-22 PROCEDURE — G8754 DIAS BP LESS 90: HCPCS | Performed by: PODIATRIST

## 2021-03-22 PROCEDURE — 3017F COLORECTAL CA SCREEN DOC REV: CPT | Performed by: PODIATRIST

## 2021-03-22 PROCEDURE — 1101F PT FALLS ASSESS-DOCD LE1/YR: CPT | Performed by: PODIATRIST

## 2021-03-22 PROCEDURE — G8417 CALC BMI ABV UP PARAM F/U: HCPCS | Performed by: PODIATRIST

## 2021-03-22 PROCEDURE — G8752 SYS BP LESS 140: HCPCS | Performed by: PODIATRIST

## 2021-03-22 PROCEDURE — 1090F PRES/ABSN URINE INCON ASSESS: CPT | Performed by: PODIATRIST

## 2021-03-22 PROCEDURE — G8427 DOCREV CUR MEDS BY ELIG CLIN: HCPCS | Performed by: PODIATRIST

## 2021-03-22 NOTE — PROGRESS NOTES
Chief Complaint   Patient presents with    Toe Pain     Pt states R great toe is hurting     1. Have you been to the ER, urgent care clinic since your last visit? Hospitalized since your last visit? Yes Reason for visit: Strss test/Friday    2. Have you seen or consulted any other health care providers outside of the [address] [place] [address] since your last visit? Include any pap smears or colon screening.  Yes Reason for visit: PCP  PCP-Dr Rocio Nieto

## 2021-03-22 NOTE — PROGRESS NOTES
Podiatry History and Physical    Subjective:         Patient is a 77 y.o. female who is being seen as a returning patient for cont pain to her left big toe. She states she tolerated the procedure during a previous office visit well but she believes the ingrown toenail has returned. States the pain rises to the level of 2 out of 10 is located to the medial nail border. She states there is redness to the toe without any overt signs of local infection. She denies any systemic signs infection. She denies any recent trauma. She denies any other pedal complaint    Past Medical History:   Diagnosis Date    Hypercholesterolemia 2020    Hypertensive disorder 2020    Lactose intolerance     Menopause 2020    Obesity     Osteopenia 2020    Tremor 2020     Past Surgical History:   Procedure Laterality Date    HX BREAST LUMPECTOMY      HX CHOLECYSTECTOMY      HX COLONOSCOPY      HX HYSTERECTOMY         Family History   Problem Relation Age of Onset    Heart Disease Mother     Hypertension Mother     Diabetes Sister       Social History     Tobacco Use    Smoking status: Former Smoker     Packs/day: 0.50     Years: 20.00     Pack years: 10.00     Quit date: 1997     Years since quittin.2    Smokeless tobacco: Never Used   Substance Use Topics    Alcohol use: Never     Frequency: Never     Allergies   Allergen Reactions    Codeine Unknown (comments)    Opioids - Morphine Analogues Unknown (comments)    Propoxyphene Unknown (comments)     Prior to Admission medications    Medication Sig Start Date End Date Taking? Authorizing Provider   benzonatate (TESSALON) 100 mg capsule Take 100 mg by mouth three (3) times daily as needed for Cough. Provider, Historical   mupirocin (BACTROBAN) 2 % ointment Apply to affected toe twice daily 10/6/20   Bhavana Waddell NP   aspirin delayed-release 81 mg tablet Take  by mouth daily.     Provider, Historical   calcium-cholecalciferol, d3, (CALCIUM 600 + D) 600-125 mg-unit tab Take  by mouth. Provider, Historical   potassium 99 mg tablet Take 99 mg by mouth daily. Provider, Historical   loratadine (Claritin) 10 mg tablet Take 10 mg by mouth. Provider, Historical   Lactobacillus acidophilus (PROBIOTIC ACIDOPHILUS PO) Take  by mouth. Provider, Historical   fluticasone propionate (Flonase Allergy Relief) 50 mcg/actuation nasal spray 2 Sprays by Both Nostrils route daily. Provider, Historical   ALPRAZolam (XANAX) 0.25 mg tablet Take  by mouth. Provider, Historical   baclofen (LIORESAL) 10 mg tablet Take  by mouth three (3) times daily. Provider, Historical   estradioL (ESTRACE) 1 mg tablet Take 1 mg by mouth daily. Provider, Historical   fluocinoNIDE (LIDEX) 0.05 % topical cream Apply  to affected area two (2) times a day. Provider, Historical   metroNIDAZOLE (FLAGYL) 500 mg tablet Take  by mouth three (3) times daily. Provider, Historical   ofloxacin (FLOXIN) 0.3 % ophthalmic solution Administer 3 Drops to both eyes four (4) times daily. Provider, Historical   primidone (MYSOLINE) 50 mg tablet Take  by mouth three (3) times daily. Provider, Historical   propranoloL (INDERAL) 10 mg tablet Take  by mouth three (3) times daily. Provider, Historical   triamterene-hydroCHLOROthiazide (MAXZIDE) 37.5-25 mg per tablet Take  by mouth daily. Provider, Historical       Review of Systems   Constitutional: Negative. HENT: Negative. Eyes: Negative. Respiratory: Negative. Cardiovascular: Negative. Gastrointestinal: Negative. Genitourinary: Negative. Musculoskeletal: Negative. Skin: Positive for wound. Allergic/Immunologic: Positive for environmental allergies. Neurological: Negative. Hematological: Negative. Psychiatric/Behavioral: Positive for dysphoric mood. All other systems reviewed and are negative.       Objective:     Visit Vitals  /84 (BP 1 Location: Right upper arm, BP Patient Position: Sitting, BP Cuff Size: Large adult)   Pulse 87   Temp 96.8 °F (36 °C) (Temporal)   Ht 5' 3\" (1.6 m)   Wt 176 lb 9.6 oz (80.1 kg)   SpO2 99%   BMI 31.28 kg/m²       Physical Exam  Vitals signs reviewed. Constitutional:       Appearance: She is obese. Cardiovascular:      Pulses:           Dorsalis pedis pulses are 2+ on the right side and 2+ on the left side. Posterior tibial pulses are 2+ on the right side and 2+ on the left side. Pulmonary:      Effort: Pulmonary effort is normal.   Musculoskeletal:      Right lower leg: No edema. Left lower leg: No edema. Right foot: Normal range of motion. No deformity. Left foot: Decreased range of motion. Deformity present. Feet:      Right foot:      Protective Sensation: 10 sites tested. 10 sites sensed. Skin integrity: Skin integrity normal.      Toenail Condition: Right toenails are normal.      Left foot:      Protective Sensation: 10 sites tested. 10 sites sensed. Skin integrity: Erythema present. Toenail Condition: Left toenails are ingrown. Lymphadenopathy:      Lower Body: No right inguinal adenopathy. No left inguinal adenopathy. Skin:     General: Skin is warm. Capillary Refill: Capillary refill takes 2 to 3 seconds. Neurological:      Mental Status: She is alert and oriented to person, place, and time. Psychiatric:         Mood and Affect: Mood and affect normal.         Behavior: Behavior is cooperative. Data Review: No results found for this or any previous visit (from the past 24 hour(s)). Impression:     - Ingrown toenail, left hallux    Recommendation:     Patient seen and evaluated in the office  Discussed and educated patient regarding her current medical condition. A slant back procedure was performed to the medial nail border of the left hallux with a nail nipper without incident. Patient tolerated well.   Instructed patient that she needed to perform Epsom salt and warm water soaks for 15 minutes each day to prevent the ingrown from returning. Also educated patient that she may need a more permanent procedure if the symptoms persist or return.   Patient verbalized understanding

## 2021-06-14 VITALS — BODY MASS INDEX: 31.28 KG/M2 | HEIGHT: 63 IN

## 2021-06-17 DIAGNOSIS — N95.1 MENOPAUSAL AND FEMALE CLIMACTERIC STATES: ICD-10-CM

## 2021-06-18 RX ORDER — ESTRADIOL 1 MG/1
TABLET ORAL
Qty: 90 TABLET | Refills: 3 | Status: SHIPPED | OUTPATIENT
Start: 2021-06-18 | End: 2021-06-29 | Stop reason: SDUPTHER

## 2021-06-29 ENCOUNTER — OFFICE VISIT (OUTPATIENT)
Dept: OBGYN CLINIC | Age: 76
End: 2021-06-29

## 2021-06-29 VITALS
SYSTOLIC BLOOD PRESSURE: 132 MMHG | WEIGHT: 176 LBS | BODY MASS INDEX: 31.18 KG/M2 | HEIGHT: 63 IN | DIASTOLIC BLOOD PRESSURE: 77 MMHG

## 2021-06-29 DIAGNOSIS — Z90.711 SCREENING FOR MALIGNANT NEOPLASM OF VAGINA AFTER PARTIAL HYSTERECTOMY: Primary | ICD-10-CM

## 2021-06-29 DIAGNOSIS — N95.1 MENOPAUSAL AND FEMALE CLIMACTERIC STATES: ICD-10-CM

## 2021-06-29 DIAGNOSIS — N95.1 MENOPAUSAL SYNDROME: ICD-10-CM

## 2021-06-29 DIAGNOSIS — Z12.72 SCREENING FOR MALIGNANT NEOPLASM OF VAGINA AFTER PARTIAL HYSTERECTOMY: Primary | ICD-10-CM

## 2021-06-29 PROCEDURE — G8536 NO DOC ELDER MAL SCRN: HCPCS | Performed by: OBSTETRICS & GYNECOLOGY

## 2021-06-29 PROCEDURE — G8427 DOCREV CUR MEDS BY ELIG CLIN: HCPCS | Performed by: OBSTETRICS & GYNECOLOGY

## 2021-06-29 PROCEDURE — G8510 SCR DEP NEG, NO PLAN REQD: HCPCS | Performed by: OBSTETRICS & GYNECOLOGY

## 2021-06-29 PROCEDURE — G0101 CA SCREEN;PELVIC/BREAST EXAM: HCPCS | Performed by: OBSTETRICS & GYNECOLOGY

## 2021-06-29 PROCEDURE — G8417 CALC BMI ABV UP PARAM F/U: HCPCS | Performed by: OBSTETRICS & GYNECOLOGY

## 2021-06-29 RX ORDER — ESTRADIOL 1 MG/1
1 TABLET ORAL DAILY
Qty: 90 TABLET | Refills: 3 | Status: SHIPPED | OUTPATIENT
Start: 2021-06-29 | End: 2022-06-24

## 2021-06-29 NOTE — PROGRESS NOTES
Lashon Class is a , 77 y.o. female   No LMP recorded. Patient has had a hysterectomy. She presents for her annual    She is having no significant problems. Menstrual status:  Cycles are hysterectomy. Flow: absent. She does not have dysmenorrhea. Medical conditions:  Since her last annual GYN exam about one year ago, she has not the following changes in her health history: none. Mammogram History:    ROXANNE Results (most recent):  No results found for this or any previous visit. DEXA Results (most recent):  No results found for this or any previous visit. Past Medical History:   Diagnosis Date    Hypercholesterolemia 2020    Hypertensive disorder 2020    Lactose intolerance     Menopause 2020    Obesity     Osteopenia 2020    Tremor 2020     Past Surgical History:   Procedure Laterality Date    HX BREAST LUMPECTOMY      HX CHOLECYSTECTOMY      HX COLONOSCOPY      HX CYST INCISION AND DRAINAGE      HX GI  2016    Colonoscopy    HX HYSTERECTOMY         Prior to Admission medications    Medication Sig Start Date End Date Taking? Authorizing Provider   estradioL (ESTRACE) 1 mg tablet Take 1 Tablet by mouth daily. Indications: \"change of life\" signs 21  Yes Kristin Yoon MD   aspirin delayed-release 81 mg tablet Take  by mouth daily. Yes Provider, Historical   calcium-cholecalciferol, d3, (CALCIUM 600 + D) 600-125 mg-unit tab Take  by mouth. Yes Provider, Historical   potassium 99 mg tablet Take 99 mg by mouth daily. Yes Provider, Historical   loratadine (Claritin) 10 mg tablet Take 10 mg by mouth. Yes Provider, Historical   Lactobacillus acidophilus (PROBIOTIC ACIDOPHILUS PO) Take  by mouth. Yes Provider, Historical   fluticasone propionate (Flonase Allergy Relief) 50 mcg/actuation nasal spray 2 Sprays by Both Nostrils route daily. Yes Provider, Historical   ALPRAZolam (XANAX) 0.25 mg tablet Take  by mouth.    Yes Provider, Historical   fluocinoNIDE (LIDEX) 0.05 % topical cream Apply  to affected area two (2) times a day. Yes Provider, Historical   primidone (MYSOLINE) 50 mg tablet Take  by mouth three (3) times daily. Yes Provider, Historical   propranoloL (INDERAL) 10 mg tablet Take  by mouth three (3) times daily. Yes Provider, Historical   triamterene-hydroCHLOROthiazide (MAXZIDE) 37.5-25 mg per tablet Take  by mouth daily. Yes Provider, Historical       Allergies   Allergen Reactions    Codeine Rash    Opioids - Morphine Analogues Rash    Propoxyphene Rash          Tobacco History:  reports that she quit smoking about 24 years ago. She has a 10.00 pack-year smoking history. She has never used smokeless tobacco.  Alcohol Abuse:  reports no history of alcohol use. Drug Abuse:  reports no history of drug use. Family Medical/Cancer History:   Family History   Problem Relation Age of Onset    Heart Disease Mother     Hypertension Mother     Diabetes Sister           Review of Systems   Constitutional: Negative for chills, fever, malaise/fatigue and weight loss. HENT: Negative for congestion, ear pain, sinus pain and tinnitus. Eyes: Negative for blurred vision and double vision. Respiratory: Negative for cough, shortness of breath and wheezing. Cardiovascular: Negative for chest pain and palpitations. Gastrointestinal: Negative for abdominal pain, blood in stool, constipation, diarrhea, heartburn, nausea and vomiting. Genitourinary: Negative for dysuria, flank pain, frequency, hematuria and urgency. Musculoskeletal: Negative for joint pain and myalgias. Skin: Negative for itching and rash. Neurological: Negative for dizziness, weakness and headaches. Psychiatric/Behavioral: Negative for depression, memory loss and suicidal ideas. The patient is not nervous/anxious and does not have insomnia. Physical Exam  Constitutional:       Appearance: Normal appearance.    HENT:      Head: Normocephalic and atraumatic. Cardiovascular:      Rate and Rhythm: Normal rate. Heart sounds: Normal heart sounds. Pulmonary:      Effort: Pulmonary effort is normal.      Breath sounds: Normal breath sounds. Chest:      Breasts:         Right: Normal.         Left: Normal.   Abdominal:      General: Abdomen is flat. Palpations: Abdomen is soft. Genitourinary:     General: Normal vulva. Vagina: Normal.      Uterus: Absent. Adnexa: Right adnexa normal and left adnexa normal.      Rectum: Normal.      Comments: PAP Obtained  Neurological:      Mental Status: She is alert. Psychiatric:         Mood and Affect: Mood normal.         Behavior: Behavior normal.         Thought Content: Thought content normal.          Visit Vitals  /77 (BP 1 Location: Right arm, BP Patient Position: Sitting, BP Cuff Size: Small adult)   Ht 5' 3\" (1.6 m)   Wt 176 lb (79.8 kg)   BMI 31.18 kg/m²         Assessment:   Diagnoses and all orders for this visit:    1. Screening for malignant neoplasm of vagina after partial hysterectomy  -     PAP IG, RFX APTIMA HPV ASCUS ([id number])    2. Menopausal syndrome    3. Menopausal and female climacteric states  -     estradioL (ESTRACE) 1 mg tablet; Take 1 Tablet by mouth daily. Indications: \"change of life\" signs        Plan: Questions addressed  Counseled re: diet, exercise, healthy lifestyle  Return for Annual  Rec annual mammogram        Follow-up and Dispositions    · Return for 1 yr annual, 1 yr mammo.

## 2021-06-29 NOTE — PROGRESS NOTES
Chief Complaint   Patient presents with    Annual Exam     Mammo done here today, Colonoscopy-2016     Visit Vitals  /77 (BP 1 Location: Right arm, BP Patient Position: Sitting, BP Cuff Size: Small adult)   Ht 5' 3\" (1.6 m)   Wt 176 lb (79.8 kg)   BMI 31.18 kg/m²

## 2021-07-01 LAB
CYTOLOGIST CVX/VAG CYTO: NORMAL
CYTOLOGY CVX/VAG DOC CYTO: NORMAL
CYTOLOGY CVX/VAG DOC THIN PREP: NORMAL
DX ICD CODE: NORMAL
LABCORP, 190119: NORMAL
Lab: NORMAL
OTHER STN SPEC: NORMAL
STAT OF ADQ CVX/VAG CYTO-IMP: NORMAL

## 2022-03-18 PROBLEM — I10 HYPERTENSIVE DISORDER: Status: ACTIVE | Noted: 2020-08-21

## 2022-03-18 PROBLEM — L60.0 INGROWN TOENAIL OF LEFT FOOT: Status: ACTIVE | Noted: 2021-02-10

## 2022-03-18 PROBLEM — L03.032 PARONYCHIA OF GREAT TOE OF LEFT FOOT: Status: ACTIVE | Noted: 2020-09-16

## 2022-03-19 PROBLEM — M85.80 OSTEOPENIA: Status: ACTIVE | Noted: 2020-08-21

## 2022-03-19 PROBLEM — R25.1 TREMOR: Status: ACTIVE | Noted: 2020-08-21

## 2022-03-19 PROBLEM — Z78.0 MENOPAUSE: Status: ACTIVE | Noted: 2020-08-21

## 2022-03-20 PROBLEM — E78.00 HYPERCHOLESTEROLEMIA: Status: ACTIVE | Noted: 2020-08-21

## 2022-06-20 DIAGNOSIS — N95.1 MENOPAUSAL AND FEMALE CLIMACTERIC STATES: ICD-10-CM

## 2022-06-24 RX ORDER — ESTRADIOL 1 MG/1
1 TABLET ORAL DAILY
Qty: 90 TABLET | Refills: 3 | Status: SHIPPED | OUTPATIENT
Start: 2022-06-24

## 2023-05-09 ENCOUNTER — TELEPHONE (OUTPATIENT)
Age: 78
End: 2023-05-09

## 2023-05-09 NOTE — TELEPHONE ENCOUNTER
Patient left a voicemail on 05/09 @1:34 PM needing to schedule her annual exam appointment     I returned the patients call on 05/09 @1:55 PM. Appointment was scheduled with Dr. Edith Carias for 07/05

## 2023-05-21 RX ORDER — ASPIRIN 81 MG/1
TABLET ORAL DAILY
COMMUNITY

## 2023-05-21 RX ORDER — LORATADINE 10 MG/1
10 TABLET ORAL
COMMUNITY

## 2023-05-21 RX ORDER — ESTRADIOL 1 MG/1
1 TABLET ORAL DAILY
COMMUNITY
Start: 2022-06-24

## 2023-05-21 RX ORDER — PRIMIDONE 50 MG/1
TABLET ORAL 3 TIMES DAILY
COMMUNITY

## 2023-05-21 RX ORDER — ALPRAZOLAM 0.25 MG/1
TABLET ORAL
COMMUNITY

## 2023-05-21 RX ORDER — TRIAMTERENE AND HYDROCHLOROTHIAZIDE 37.5; 25 MG/1; MG/1
TABLET ORAL DAILY
COMMUNITY

## 2023-05-21 RX ORDER — PROPRANOLOL HYDROCHLORIDE 10 MG/1
TABLET ORAL 3 TIMES DAILY
COMMUNITY

## 2023-05-21 RX ORDER — FLUTICASONE PROPIONATE 50 MCG
2 SPRAY, SUSPENSION (ML) NASAL DAILY
COMMUNITY

## 2023-06-21 VITALS — BODY MASS INDEX: 31.18 KG/M2 | HEIGHT: 63 IN

## 2023-07-05 ENCOUNTER — OFFICE VISIT (OUTPATIENT)
Age: 78
End: 2023-07-05

## 2023-07-05 VITALS
BODY MASS INDEX: 30.48 KG/M2 | WEIGHT: 172 LBS | SYSTOLIC BLOOD PRESSURE: 116 MMHG | DIASTOLIC BLOOD PRESSURE: 74 MMHG | HEIGHT: 63 IN

## 2023-07-05 DIAGNOSIS — N95.9 MENOPAUSAL PROBLEM: Primary | ICD-10-CM

## 2023-07-05 DIAGNOSIS — Z12.72 SCREENING FOR VAGINAL CANCER: ICD-10-CM

## 2023-07-05 ASSESSMENT — ENCOUNTER SYMPTOMS
RESPIRATORY NEGATIVE: 1
GASTROINTESTINAL NEGATIVE: 1

## 2023-07-08 LAB
., LABCORP: NORMAL
CYTOLOGIST CVX/VAG CYTO: NORMAL
CYTOLOGY CVX/VAG DOC CYTO: NORMAL
CYTOLOGY CVX/VAG DOC THIN PREP: NORMAL
DX ICD CODE: NORMAL
Lab: NORMAL
OTHER STN SPEC: NORMAL
STAT OF ADQ CVX/VAG CYTO-IMP: NORMAL

## 2023-08-01 DIAGNOSIS — N95.9 MENOPAUSAL PROBLEM: ICD-10-CM

## 2023-08-03 ENCOUNTER — TELEPHONE (OUTPATIENT)
Age: 78
End: 2023-08-03

## 2023-08-03 NOTE — TELEPHONE ENCOUNTER
Called to discuss DEXA results  LVM at home number, NA on cell  Normal BMD  Stressed Ca/Vit. D/Watch fall risk

## 2024-07-08 ENCOUNTER — HOSPITAL ENCOUNTER (INPATIENT)
Facility: HOSPITAL | Age: 79
LOS: 2 days | Discharge: HOME HEALTH | DRG: 494 | End: 2024-07-10
Attending: EMERGENCY MEDICINE | Admitting: INTERNAL MEDICINE
Payer: MEDICARE

## 2024-07-08 ENCOUNTER — ANESTHESIA (OUTPATIENT)
Facility: HOSPITAL | Age: 79
DRG: 494 | End: 2024-07-08
Payer: MEDICARE

## 2024-07-08 ENCOUNTER — APPOINTMENT (OUTPATIENT)
Facility: HOSPITAL | Age: 79
DRG: 494 | End: 2024-07-08
Payer: MEDICARE

## 2024-07-08 ENCOUNTER — ANESTHESIA EVENT (OUTPATIENT)
Facility: HOSPITAL | Age: 79
DRG: 494 | End: 2024-07-08
Payer: MEDICARE

## 2024-07-08 DIAGNOSIS — S93.04XA ANKLE DISLOCATION, RIGHT, INITIAL ENCOUNTER: Primary | ICD-10-CM

## 2024-07-08 DIAGNOSIS — S82.891A ANKLE FRACTURE, RIGHT, CLOSED, INITIAL ENCOUNTER: ICD-10-CM

## 2024-07-08 DIAGNOSIS — S82.891A CLOSED FRACTURE OF RIGHT ANKLE, INITIAL ENCOUNTER: ICD-10-CM

## 2024-07-08 LAB
ABO + RH BLD: NORMAL
ALBUMIN SERPL-MCNC: 3.2 G/DL (ref 3.5–5)
ALBUMIN/GLOB SERPL: 0.9 (ref 1.1–2.2)
ALP SERPL-CCNC: 42 U/L (ref 45–117)
ALT SERPL-CCNC: 17 U/L (ref 12–78)
ANION GAP SERPL CALC-SCNC: 5 MMOL/L (ref 5–15)
APTT PPP: 30.8 SEC (ref 21.2–34.1)
AST SERPL W P-5'-P-CCNC: 9 U/L (ref 15–37)
BASOPHILS # BLD: 0.1 K/UL (ref 0–0.1)
BASOPHILS NFR BLD: 2 % (ref 0–1)
BILIRUB SERPL-MCNC: 0.3 MG/DL (ref 0.2–1)
BLOOD GROUP ANTIBODIES SERPL: NEGATIVE
BUN SERPL-MCNC: 21 MG/DL (ref 6–20)
BUN/CREAT SERPL: 19 (ref 12–20)
CA-I BLD-MCNC: 9.4 MG/DL (ref 8.5–10.1)
CHLORIDE SERPL-SCNC: 106 MMOL/L (ref 97–108)
CO2 SERPL-SCNC: 26 MMOL/L (ref 21–32)
CREAT SERPL-MCNC: 1.12 MG/DL (ref 0.55–1.02)
DIFFERENTIAL METHOD BLD: ABNORMAL
EOSINOPHIL # BLD: 0.3 K/UL (ref 0–0.4)
EOSINOPHIL NFR BLD: 4 % (ref 0–7)
ERYTHROCYTE [DISTWIDTH] IN BLOOD BY AUTOMATED COUNT: 13.1 % (ref 11.5–14.5)
GLOBULIN SER CALC-MCNC: 3.6 G/DL (ref 2–4)
GLUCOSE SERPL-MCNC: 125 MG/DL (ref 65–100)
HCT VFR BLD AUTO: 34.1 % (ref 35–47)
HGB BLD-MCNC: 11.1 G/DL (ref 11.5–16)
IMM GRANULOCYTES # BLD AUTO: 0.1 K/UL (ref 0–0.04)
IMM GRANULOCYTES NFR BLD AUTO: 1 % (ref 0–0.5)
INR PPP: 1 (ref 0.9–1.1)
LYMPHOCYTES # BLD: 1.8 K/UL (ref 0.8–3.5)
LYMPHOCYTES NFR BLD: 23 % (ref 12–49)
MCH RBC QN AUTO: 28.8 PG (ref 26–34)
MCHC RBC AUTO-ENTMCNC: 32.6 G/DL (ref 30–36.5)
MCV RBC AUTO: 88.6 FL (ref 80–99)
MONOCYTES # BLD: 0.9 K/UL (ref 0–1)
MONOCYTES NFR BLD: 11 % (ref 5–13)
NEUTS SEG # BLD: 4.6 K/UL (ref 1.8–8)
NEUTS SEG NFR BLD: 59 % (ref 32–75)
NRBC # BLD: 0 K/UL (ref 0–0.01)
NRBC BLD-RTO: 0 PER 100 WBC
PLATELET # BLD AUTO: 161 K/UL (ref 150–400)
PMV BLD AUTO: 11.1 FL (ref 8.9–12.9)
POTASSIUM SERPL-SCNC: 4 MMOL/L (ref 3.5–5.1)
PROT SERPL-MCNC: 6.8 G/DL (ref 6.4–8.2)
PROTHROMBIN TIME: 13.2 SEC (ref 11.9–14.6)
RBC # BLD AUTO: 3.85 M/UL (ref 3.8–5.2)
SODIUM SERPL-SCNC: 137 MMOL/L (ref 136–145)
SPECIMEN EXP DATE BLD: NORMAL
THERAPEUTIC RANGE: NORMAL SEC (ref 82–109)
WBC # BLD AUTO: 7.7 K/UL (ref 3.6–11)

## 2024-07-08 PROCEDURE — 2580000003 HC RX 258: Performed by: ORTHOPAEDIC SURGERY

## 2024-07-08 PROCEDURE — 2580000003 HC RX 258: Performed by: INTERNAL MEDICINE

## 2024-07-08 PROCEDURE — 3600000014 HC SURGERY LEVEL 4 ADDTL 15MIN: Performed by: ORTHOPAEDIC SURGERY

## 2024-07-08 PROCEDURE — 3600000004 HC SURGERY LEVEL 4 BASE: Performed by: ORTHOPAEDIC SURGERY

## 2024-07-08 PROCEDURE — 73610 X-RAY EXAM OF ANKLE: CPT

## 2024-07-08 PROCEDURE — 86901 BLOOD TYPING SEROLOGIC RH(D): CPT

## 2024-07-08 PROCEDURE — 1100000000 HC RM PRIVATE

## 2024-07-08 PROCEDURE — 99285 EMERGENCY DEPT VISIT HI MDM: CPT

## 2024-07-08 PROCEDURE — 86900 BLOOD TYPING SEROLOGIC ABO: CPT

## 2024-07-08 PROCEDURE — 2709999900 HC NON-CHARGEABLE SUPPLY: Performed by: ORTHOPAEDIC SURGERY

## 2024-07-08 PROCEDURE — 7100000001 HC PACU RECOVERY - ADDTL 15 MIN: Performed by: ORTHOPAEDIC SURGERY

## 2024-07-08 PROCEDURE — 2580000003 HC RX 258: Performed by: NURSE ANESTHETIST, CERTIFIED REGISTERED

## 2024-07-08 PROCEDURE — 6360000002 HC RX W HCPCS: Performed by: EMERGENCY MEDICINE

## 2024-07-08 PROCEDURE — C1713 ANCHOR/SCREW BN/BN,TIS/BN: HCPCS | Performed by: ORTHOPAEDIC SURGERY

## 2024-07-08 PROCEDURE — 85730 THROMBOPLASTIN TIME PARTIAL: CPT

## 2024-07-08 PROCEDURE — 0QSJ04Z REPOSITION RIGHT FIBULA WITH INTERNAL FIXATION DEVICE, OPEN APPROACH: ICD-10-PCS | Performed by: ORTHOPAEDIC SURGERY

## 2024-07-08 PROCEDURE — 7100000000 HC PACU RECOVERY - FIRST 15 MIN: Performed by: ORTHOPAEDIC SURGERY

## 2024-07-08 PROCEDURE — 6360000002 HC RX W HCPCS: Performed by: NURSE ANESTHETIST, CERTIFIED REGISTERED

## 2024-07-08 PROCEDURE — 0QSJXZZ REPOSITION RIGHT FIBULA, EXTERNAL APPROACH: ICD-10-PCS | Performed by: EMERGENCY MEDICINE

## 2024-07-08 PROCEDURE — 85610 PROTHROMBIN TIME: CPT

## 2024-07-08 PROCEDURE — 64447 NJX AA&/STRD FEMORAL NRV IMG: CPT | Performed by: ANESTHESIOLOGY

## 2024-07-08 PROCEDURE — 73700 CT LOWER EXTREMITY W/O DYE: CPT

## 2024-07-08 PROCEDURE — 3700000000 HC ANESTHESIA ATTENDED CARE: Performed by: ORTHOPAEDIC SURGERY

## 2024-07-08 PROCEDURE — 64445 NJX AA&/STRD SCIATIC NRV IMG: CPT | Performed by: ANESTHESIOLOGY

## 2024-07-08 PROCEDURE — 6360000002 HC RX W HCPCS: Performed by: ORTHOPAEDIC SURGERY

## 2024-07-08 PROCEDURE — 85025 COMPLETE CBC W/AUTO DIFF WBC: CPT

## 2024-07-08 PROCEDURE — 0QSGXZZ REPOSITION RIGHT TIBIA, EXTERNAL APPROACH: ICD-10-PCS | Performed by: EMERGENCY MEDICINE

## 2024-07-08 PROCEDURE — 36415 COLL VENOUS BLD VENIPUNCTURE: CPT

## 2024-07-08 PROCEDURE — 3700000001 HC ADD 15 MINUTES (ANESTHESIA): Performed by: ORTHOPAEDIC SURGERY

## 2024-07-08 PROCEDURE — 86850 RBC ANTIBODY SCREEN: CPT

## 2024-07-08 PROCEDURE — 96374 THER/PROPH/DIAG INJ IV PUSH: CPT

## 2024-07-08 PROCEDURE — 6370000000 HC RX 637 (ALT 250 FOR IP): Performed by: ORTHOPAEDIC SURGERY

## 2024-07-08 PROCEDURE — 80053 COMPREHEN METABOLIC PANEL: CPT

## 2024-07-08 PROCEDURE — 73600 X-RAY EXAM OF ANKLE: CPT

## 2024-07-08 PROCEDURE — 76000 FLUOROSCOPY <1 HR PHYS/QHP: CPT

## 2024-07-08 DEVICE — SCREW BNE L14MM DIA3.5MM CORT TI ST NONLOCKING HD LO PROF: Type: IMPLANTABLE DEVICE | Site: ANKLE | Status: FUNCTIONAL

## 2024-07-08 DEVICE — SYSTEM IMPL TI UHMWPE KNOTLESS FOR SYNDESMOSIS FIX: Type: IMPLANTABLE DEVICE | Site: ANKLE | Status: FUNCTIONAL

## 2024-07-08 DEVICE — SCREW BNE L20MM DIA3.5MM CORT TI ST NONLOCKING HD LO PROF: Type: IMPLANTABLE DEVICE | Site: ANKLE | Status: FUNCTIONAL

## 2024-07-08 DEVICE — SCREW KRULOCK 3.0 X 18: Type: IMPLANTABLE DEVICE | Site: ANKLE | Status: FUNCTIONAL

## 2024-07-08 DEVICE — PLATE BONE RT DSTL FIB TI LCK 6H: Type: IMPLANTABLE DEVICE | Site: ANKLE | Status: FUNCTIONAL

## 2024-07-08 DEVICE — SCREW KRULOCK 3.0 X 20: Type: IMPLANTABLE DEVICE | Site: ANKLE | Status: FUNCTIONAL

## 2024-07-08 DEVICE — SCREW VAL KREULOCK T1 3.0 X 14: Type: IMPLANTABLE DEVICE | Site: ANKLE | Status: FUNCTIONAL

## 2024-07-08 DEVICE — SCREW KRULOCK 3.0 X 16: Type: IMPLANTABLE DEVICE | Site: ANKLE | Status: FUNCTIONAL

## 2024-07-08 DEVICE — SCREW BNE L12MM DIA3.5MM ANK TI LO PROF: Type: IMPLANTABLE DEVICE | Site: ANKLE | Status: FUNCTIONAL

## 2024-07-08 RX ORDER — HYDRALAZINE HYDROCHLORIDE 20 MG/ML
10 INJECTION INTRAMUSCULAR; INTRAVENOUS
Status: DISCONTINUED | OUTPATIENT
Start: 2024-07-08 | End: 2024-07-08

## 2024-07-08 RX ORDER — SODIUM CHLORIDE 0.9 % (FLUSH) 0.9 %
5-40 SYRINGE (ML) INJECTION EVERY 12 HOURS SCHEDULED
Status: DISCONTINUED | OUTPATIENT
Start: 2024-07-08 | End: 2024-07-08

## 2024-07-08 RX ORDER — OXYCODONE HYDROCHLORIDE 5 MG/1
10 TABLET ORAL PRN
Status: DISCONTINUED | OUTPATIENT
Start: 2024-07-08 | End: 2024-07-08

## 2024-07-08 RX ORDER — SODIUM CHLORIDE 0.9 % (FLUSH) 0.9 %
5-40 SYRINGE (ML) INJECTION PRN
Status: DISCONTINUED | OUTPATIENT
Start: 2024-07-08 | End: 2024-07-08

## 2024-07-08 RX ORDER — LABETALOL HYDROCHLORIDE 5 MG/ML
10 INJECTION, SOLUTION INTRAVENOUS
Status: DISCONTINUED | OUTPATIENT
Start: 2024-07-08 | End: 2024-07-08

## 2024-07-08 RX ORDER — UREA 10 %
5 LOTION (ML) TOPICAL NIGHTLY PRN
Status: DISCONTINUED | OUTPATIENT
Start: 2024-07-08 | End: 2024-07-10 | Stop reason: HOSPADM

## 2024-07-08 RX ORDER — PROPRANOLOL HYDROCHLORIDE 10 MG/1
10 TABLET ORAL 3 TIMES DAILY
Status: DISCONTINUED | OUTPATIENT
Start: 2024-07-08 | End: 2024-07-09

## 2024-07-08 RX ORDER — ALPRAZOLAM 0.25 MG/1
0.25 TABLET ORAL 2 TIMES DAILY PRN
Status: ON HOLD | COMMUNITY
End: 2024-07-10 | Stop reason: HOSPADM

## 2024-07-08 RX ORDER — LIDOCAINE 4 G/G
1 PATCH TOPICAL AS NEEDED
Status: DISCONTINUED | OUTPATIENT
Start: 2024-07-08 | End: 2024-07-08

## 2024-07-08 RX ORDER — TRAMADOL HYDROCHLORIDE 50 MG/1
50 TABLET ORAL EVERY 6 HOURS PRN
Status: DISCONTINUED | OUTPATIENT
Start: 2024-07-08 | End: 2024-07-10 | Stop reason: HOSPADM

## 2024-07-08 RX ORDER — DIPHENHYDRAMINE HYDROCHLORIDE 50 MG/ML
12.5 INJECTION INTRAMUSCULAR; INTRAVENOUS
Status: DISCONTINUED | OUTPATIENT
Start: 2024-07-08 | End: 2024-07-08

## 2024-07-08 RX ORDER — LIDOCAINE HYDROCHLORIDE 20 MG/ML
INJECTION, SOLUTION EPIDURAL; INFILTRATION; INTRACAUDAL; PERINEURAL PRN
Status: DISCONTINUED | OUTPATIENT
Start: 2024-07-08 | End: 2024-07-08 | Stop reason: SDUPTHER

## 2024-07-08 RX ORDER — PRIMIDONE 50 MG/1
50 TABLET ORAL NIGHTLY
Status: DISCONTINUED | OUTPATIENT
Start: 2024-07-08 | End: 2024-07-09

## 2024-07-08 RX ORDER — MAGNESIUM HYDROXIDE/ALUMINUM HYDROXICE/SIMETHICONE 120; 1200; 1200 MG/30ML; MG/30ML; MG/30ML
30 SUSPENSION ORAL EVERY 6 HOURS PRN
Status: DISCONTINUED | OUTPATIENT
Start: 2024-07-08 | End: 2024-07-10 | Stop reason: HOSPADM

## 2024-07-08 RX ORDER — ONDANSETRON 2 MG/ML
INJECTION INTRAMUSCULAR; INTRAVENOUS PRN
Status: DISCONTINUED | OUTPATIENT
Start: 2024-07-08 | End: 2024-07-08 | Stop reason: SDUPTHER

## 2024-07-08 RX ORDER — LIDOCAINE HYDROCHLORIDE 10 MG/ML
INJECTION, SOLUTION INFILTRATION; PERINEURAL
Status: DISPENSED
Start: 2024-07-08 | End: 2024-07-09

## 2024-07-08 RX ORDER — SODIUM CHLORIDE 9 MG/ML
INJECTION, SOLUTION INTRAVENOUS PRN
Status: DISCONTINUED | OUTPATIENT
Start: 2024-07-08 | End: 2024-07-08

## 2024-07-08 RX ORDER — SODIUM CHLORIDE 9 MG/ML
INJECTION, SOLUTION INTRAVENOUS PRN
Status: DISCONTINUED | OUTPATIENT
Start: 2024-07-08 | End: 2024-07-10 | Stop reason: HOSPADM

## 2024-07-08 RX ORDER — ASPIRIN 81 MG/1
81 TABLET, CHEWABLE ORAL 2 TIMES DAILY
Status: DISCONTINUED | OUTPATIENT
Start: 2024-07-09 | End: 2024-07-10 | Stop reason: HOSPADM

## 2024-07-08 RX ORDER — PROPOFOL 10 MG/ML
INJECTION, EMULSION INTRAVENOUS PRN
Status: DISCONTINUED | OUTPATIENT
Start: 2024-07-08 | End: 2024-07-08 | Stop reason: SDUPTHER

## 2024-07-08 RX ORDER — SODIUM CHLORIDE 9 MG/ML
INJECTION, SOLUTION INTRAVENOUS CONTINUOUS
Status: DISCONTINUED | OUTPATIENT
Start: 2024-07-08 | End: 2024-07-10 | Stop reason: HOSPADM

## 2024-07-08 RX ORDER — IPRATROPIUM BROMIDE AND ALBUTEROL SULFATE 2.5; .5 MG/3ML; MG/3ML
1 SOLUTION RESPIRATORY (INHALATION)
Status: DISCONTINUED | OUTPATIENT
Start: 2024-07-08 | End: 2024-07-08

## 2024-07-08 RX ORDER — ROPIVACAINE HYDROCHLORIDE 5 MG/ML
INJECTION, SOLUTION EPIDURAL; INFILTRATION; PERINEURAL
Status: COMPLETED
Start: 2024-07-08 | End: 2024-07-08

## 2024-07-08 RX ORDER — PROPOFOL 10 MG/ML
INJECTION, EMULSION INTRAVENOUS PRN
Status: DISCONTINUED | OUTPATIENT
Start: 2024-07-08 | End: 2024-07-08

## 2024-07-08 RX ORDER — SODIUM CHLORIDE 0.9 % (FLUSH) 0.9 %
5-40 SYRINGE (ML) INJECTION PRN
Status: DISCONTINUED | OUTPATIENT
Start: 2024-07-08 | End: 2024-07-10 | Stop reason: HOSPADM

## 2024-07-08 RX ORDER — FENTANYL CITRATE 50 UG/ML
INJECTION, SOLUTION INTRAMUSCULAR; INTRAVENOUS PRN
Status: DISCONTINUED | OUTPATIENT
Start: 2024-07-08 | End: 2024-07-08 | Stop reason: SDUPTHER

## 2024-07-08 RX ORDER — DEXTROSE MONOHYDRATE 100 MG/ML
INJECTION, SOLUTION INTRAVENOUS CONTINUOUS PRN
Status: DISCONTINUED | OUTPATIENT
Start: 2024-07-08 | End: 2024-07-08

## 2024-07-08 RX ORDER — SODIUM CHLORIDE, SODIUM LACTATE, POTASSIUM CHLORIDE, CALCIUM CHLORIDE 600; 310; 30; 20 MG/100ML; MG/100ML; MG/100ML; MG/100ML
INJECTION, SOLUTION INTRAVENOUS ONCE
Status: DISCONTINUED | OUTPATIENT
Start: 2024-07-08 | End: 2024-07-08

## 2024-07-08 RX ORDER — GLUCAGON 1 MG/ML
1 KIT INJECTION PRN
Status: DISCONTINUED | OUTPATIENT
Start: 2024-07-08 | End: 2024-07-08

## 2024-07-08 RX ORDER — SODIUM CHLORIDE 0.9 % (FLUSH) 0.9 %
5-40 SYRINGE (ML) INJECTION EVERY 12 HOURS SCHEDULED
Status: DISCONTINUED | OUTPATIENT
Start: 2024-07-08 | End: 2024-07-10 | Stop reason: HOSPADM

## 2024-07-08 RX ORDER — MIDAZOLAM HYDROCHLORIDE 2 MG/2ML
1 INJECTION, SOLUTION INTRAMUSCULAR; INTRAVENOUS ONCE
Status: COMPLETED | OUTPATIENT
Start: 2024-07-08 | End: 2024-07-08

## 2024-07-08 RX ORDER — POTASSIUM CHLORIDE 7.45 MG/ML
10 INJECTION INTRAVENOUS PRN
Status: DISCONTINUED | OUTPATIENT
Start: 2024-07-08 | End: 2024-07-10 | Stop reason: HOSPADM

## 2024-07-08 RX ORDER — ACETAMINOPHEN 500 MG
1000 TABLET ORAL EVERY 8 HOURS SCHEDULED
Status: DISCONTINUED | OUTPATIENT
Start: 2024-07-08 | End: 2024-07-10 | Stop reason: HOSPADM

## 2024-07-08 RX ORDER — ALPRAZOLAM 0.25 MG/1
0.25 TABLET ORAL 2 TIMES DAILY PRN
Status: DISCONTINUED | OUTPATIENT
Start: 2024-07-08 | End: 2024-07-10 | Stop reason: HOSPADM

## 2024-07-08 RX ORDER — ACETAMINOPHEN 650 MG/1
650 SUPPOSITORY RECTAL EVERY 6 HOURS PRN
Status: DISCONTINUED | OUTPATIENT
Start: 2024-07-08 | End: 2024-07-08

## 2024-07-08 RX ORDER — ENOXAPARIN SODIUM 100 MG/ML
40 INJECTION SUBCUTANEOUS DAILY
Status: DISCONTINUED | OUTPATIENT
Start: 2024-07-09 | End: 2024-07-08

## 2024-07-08 RX ORDER — POLYETHYLENE GLYCOL 3350 17 G/17G
17 POWDER, FOR SOLUTION ORAL DAILY PRN
Status: DISCONTINUED | OUTPATIENT
Start: 2024-07-08 | End: 2024-07-10 | Stop reason: HOSPADM

## 2024-07-08 RX ORDER — ROPIVACAINE HYDROCHLORIDE 5 MG/ML
INJECTION, SOLUTION EPIDURAL; INFILTRATION; PERINEURAL
Status: COMPLETED | OUTPATIENT
Start: 2024-07-08 | End: 2024-07-08

## 2024-07-08 RX ORDER — ONDANSETRON 2 MG/ML
4 INJECTION INTRAMUSCULAR; INTRAVENOUS EVERY 6 HOURS PRN
Status: DISCONTINUED | OUTPATIENT
Start: 2024-07-08 | End: 2024-07-10 | Stop reason: HOSPADM

## 2024-07-08 RX ORDER — OXYCODONE HYDROCHLORIDE 5 MG/1
5 TABLET ORAL PRN
Status: DISCONTINUED | OUTPATIENT
Start: 2024-07-08 | End: 2024-07-08

## 2024-07-08 RX ORDER — MIDAZOLAM HYDROCHLORIDE 1 MG/ML
INJECTION INTRAMUSCULAR; INTRAVENOUS PRN
Status: DISCONTINUED | OUTPATIENT
Start: 2024-07-08 | End: 2024-07-08 | Stop reason: SDUPTHER

## 2024-07-08 RX ORDER — ONDANSETRON 4 MG/1
4 TABLET, ORALLY DISINTEGRATING ORAL EVERY 8 HOURS PRN
Status: DISCONTINUED | OUTPATIENT
Start: 2024-07-08 | End: 2024-07-10 | Stop reason: HOSPADM

## 2024-07-08 RX ORDER — PHENYLEPHRINE HCL IN 0.9% NACL 1 MG/10 ML
SYRINGE (ML) INTRAVENOUS PRN
Status: DISCONTINUED | OUTPATIENT
Start: 2024-07-08 | End: 2024-07-08 | Stop reason: SDUPTHER

## 2024-07-08 RX ORDER — ACETAMINOPHEN 325 MG/1
650 TABLET ORAL EVERY 6 HOURS PRN
Status: DISCONTINUED | OUTPATIENT
Start: 2024-07-08 | End: 2024-07-08

## 2024-07-08 RX ORDER — FENTANYL CITRATE 50 UG/ML
50 INJECTION, SOLUTION INTRAMUSCULAR; INTRAVENOUS EVERY 5 MIN PRN
Status: DISCONTINUED | OUTPATIENT
Start: 2024-07-08 | End: 2024-07-08

## 2024-07-08 RX ORDER — OXYCODONE HYDROCHLORIDE 5 MG/1
5 TABLET ORAL EVERY 4 HOURS PRN
Status: DISCONTINUED | OUTPATIENT
Start: 2024-07-08 | End: 2024-07-10 | Stop reason: HOSPADM

## 2024-07-08 RX ORDER — LORAZEPAM 2 MG/ML
0.5 INJECTION INTRAMUSCULAR
Status: DISCONTINUED | OUTPATIENT
Start: 2024-07-08 | End: 2024-07-08

## 2024-07-08 RX ORDER — HYDROMORPHONE HYDROCHLORIDE 1 MG/ML
0.5 INJECTION, SOLUTION INTRAMUSCULAR; INTRAVENOUS; SUBCUTANEOUS EVERY 5 MIN PRN
Status: DISCONTINUED | OUTPATIENT
Start: 2024-07-08 | End: 2024-07-08

## 2024-07-08 RX ORDER — ASPIRIN 81 MG/1
1 TABLET, CHEWABLE ORAL DAILY
COMMUNITY

## 2024-07-08 RX ORDER — NALOXONE HYDROCHLORIDE 0.4 MG/ML
INJECTION, SOLUTION INTRAMUSCULAR; INTRAVENOUS; SUBCUTANEOUS PRN
Status: DISCONTINUED | OUTPATIENT
Start: 2024-07-08 | End: 2024-07-08

## 2024-07-08 RX ORDER — ONDANSETRON 2 MG/ML
4 INJECTION INTRAMUSCULAR; INTRAVENOUS
Status: DISCONTINUED | OUTPATIENT
Start: 2024-07-08 | End: 2024-07-08

## 2024-07-08 RX ORDER — DEXAMETHASONE SODIUM PHOSPHATE 4 MG/ML
INJECTION, SOLUTION INTRA-ARTICULAR; INTRALESIONAL; INTRAMUSCULAR; INTRAVENOUS; SOFT TISSUE PRN
Status: DISCONTINUED | OUTPATIENT
Start: 2024-07-08 | End: 2024-07-08 | Stop reason: SDUPTHER

## 2024-07-08 RX ADMIN — SODIUM CHLORIDE, PRESERVATIVE FREE 10 ML: 5 INJECTION INTRAVENOUS at 11:59

## 2024-07-08 RX ADMIN — SODIUM CHLORIDE, PRESERVATIVE FREE 10 ML: 5 INJECTION INTRAVENOUS at 21:33

## 2024-07-08 RX ADMIN — DEXAMETHASONE SODIUM PHOSPHATE 4 MG: 4 INJECTION, SOLUTION INTRA-ARTICULAR; INTRALESIONAL; INTRAMUSCULAR; INTRAVENOUS; SOFT TISSUE at 16:31

## 2024-07-08 RX ADMIN — FENTANYL CITRATE 50 MCG: 50 INJECTION, SOLUTION INTRAMUSCULAR; INTRAVENOUS at 16:36

## 2024-07-08 RX ADMIN — ACETAMINOPHEN 1000 MG: 500 TABLET ORAL at 21:32

## 2024-07-08 RX ADMIN — ONDANSETRON 4 MG: 2 INJECTION INTRAMUSCULAR; INTRAVENOUS at 16:31

## 2024-07-08 RX ADMIN — SODIUM CHLORIDE: 9 INJECTION, SOLUTION INTRAVENOUS at 16:31

## 2024-07-08 RX ADMIN — MIDAZOLAM HYDROCHLORIDE 1 MG: 1 INJECTION, SOLUTION INTRAMUSCULAR; INTRAVENOUS at 07:53

## 2024-07-08 RX ADMIN — CEFAZOLIN SODIUM 2000 MG: 1 INJECTION, POWDER, FOR SOLUTION INTRAMUSCULAR; INTRAVENOUS at 16:30

## 2024-07-08 RX ADMIN — ROPIVACAINE HYDROCHLORIDE 20 ML: 5 INJECTION, SOLUTION EPIDURAL; INFILTRATION; PERINEURAL at 15:57

## 2024-07-08 RX ADMIN — MIDAZOLAM HYDROCHLORIDE 2 MG: 1 INJECTION INTRAMUSCULAR; INTRAVENOUS at 16:00

## 2024-07-08 RX ADMIN — FENTANYL CITRATE 50 MCG: 50 INJECTION, SOLUTION INTRAMUSCULAR; INTRAVENOUS at 16:00

## 2024-07-08 RX ADMIN — ROPIVACAINE HYDROCHLORIDE 10 ML: 5 INJECTION, SOLUTION EPIDURAL; INFILTRATION; PERINEURAL at 16:12

## 2024-07-08 RX ADMIN — PROPOFOL 150 MG: 10 INJECTION, EMULSION INTRAVENOUS at 16:35

## 2024-07-08 RX ADMIN — LIDOCAINE HYDROCHLORIDE 100 MG: 20 INJECTION, SOLUTION EPIDURAL; INFILTRATION; INTRACAUDAL; PERINEURAL at 16:35

## 2024-07-08 RX ADMIN — SODIUM CHLORIDE: 9 INJECTION, SOLUTION INTRAVENOUS at 11:58

## 2024-07-08 RX ADMIN — CEFAZOLIN 2000 MG: 1 INJECTION, POWDER, FOR SOLUTION INTRAMUSCULAR; INTRAVENOUS at 21:33

## 2024-07-08 RX ADMIN — Medication 100 MCG: at 16:33

## 2024-07-08 ASSESSMENT — PAIN - FUNCTIONAL ASSESSMENT
PAIN_FUNCTIONAL_ASSESSMENT: NONE - DENIES PAIN
PAIN_FUNCTIONAL_ASSESSMENT: FACE, LEGS, ACTIVITY, CRY, AND CONSOLABILITY (FLACC)
PAIN_FUNCTIONAL_ASSESSMENT: NONE - DENIES PAIN
PAIN_FUNCTIONAL_ASSESSMENT: 0-10
PAIN_FUNCTIONAL_ASSESSMENT: 0-10
PAIN_FUNCTIONAL_ASSESSMENT: NONE - DENIES PAIN
PAIN_FUNCTIONAL_ASSESSMENT: 0-10

## 2024-07-08 ASSESSMENT — ENCOUNTER SYMPTOMS
NAUSEA: 0
SORE THROAT: 0
VOMITING: 0
COLOR CHANGE: 0
SHORTNESS OF BREATH: 0

## 2024-07-08 ASSESSMENT — PAIN DESCRIPTION - LOCATION
LOCATION: ANKLE
LOCATION: LEG
LOCATION: ANKLE

## 2024-07-08 ASSESSMENT — PAIN SCALES - GENERAL
PAINLEVEL_OUTOF10: 0
PAINLEVEL_OUTOF10: 3
PAINLEVEL_OUTOF10: 2
PAINLEVEL_OUTOF10: 3
PAINLEVEL_OUTOF10: 4
PAINLEVEL_OUTOF10: 3
PAINLEVEL_OUTOF10: 3

## 2024-07-08 ASSESSMENT — PAIN DESCRIPTION - DESCRIPTORS: DESCRIPTORS: THROBBING

## 2024-07-08 NOTE — ED TRIAGE NOTES
GLF this AM, no LOC, no blood thinners. Has tremors which called her to call. Right ankle deformity present. GOOD PMS. SPLINTED BY EMS, patient remains alert and oriented. ED physician at bedside.

## 2024-07-08 NOTE — H&P
History & Physical    Primary Care Provider: Rl Leija MD  Source of Information: Patient/family     Chief complaint:   Chief Complaint   Patient presents with    Fall    Ankle Pain        History of Presenting Illness:   Alisson Kuo is a 78 y.o. female with multiple medical problems including tremors, osteopenia, hypercholesteremia, hypertension, and obesity.  She was using the bathroom and suddenly lost her balance and tried to brace herself but then she fell and fractured her right leg.  She she denied any dizziness prior or hitting her head upon fall . She reports that she takes 81 mg of Aspirin but is otherwise not anticoagulated. Patient notes that she often loses her balance and falls.  She was taken to the ED by EMS.  Right ankle x-ray revealed acute lateral malleolus fracture.  Posterior lateral tibiotalar subluxation.  Repeat x-ray revealed interval casting and posterior dislocation of the talus.  On evaluation, she reports pain that is 3/10 on intensity. Her ankle was splinted and reports some associated numbness. No chest pain, headaches or SOB was noted.        Review of Systems:  A comprehensive review of systems was negative except for that written in the History of Present Illness.     Past Medical History:   Diagnosis Date    Hypercholesterolemia 8/21/2020    Hypertensive disorder 8/21/2020    Lactose intolerance     Menopause     Obesity     Osteopenia 8/21/2020    Tremor 8/21/2020        Past Surgical History:   Procedure Laterality Date    CHOLECYSTECTOMY      COLONOSCOPY      CYST INCISION AND DRAINAGE      GI  2016    Colonoscopy    HYSTERECTOMY (CERVIX STATUS UNKNOWN)     DBS box    Prior to Admission medications    Medication Sig Start Date End Date Taking? Authorizing Provider   ALPRAZolam (XANAX) 0.25 MG tablet Take by mouth.    Automatic Reconciliation, Ar   aspirin 81 MG EC tablet Take by mouth daily    Automatic Reconciliation, Ar   Calcium Carbonate-Vitamin D 600-3.125

## 2024-07-08 NOTE — ANESTHESIA PROCEDURE NOTES
Peripheral Block    Patient location during procedure: holding area  Reason for block: at surgeon's request  Start time: 7/8/2024 3:48 PM  End time: 7/8/2024 3:58 PM  Staffing  Performed: resident/CRNA   Anesthesiologist: Felix Benoit Jr., MD  Resident/CRNA: Rachna Gonzalez APRN - CRNA  Performed by: Rachna Gonzalez APRN - CRNA  Authorized by: Rachna Gonzalez APRN - CRNA    Preanesthetic Checklist  Completed: patient identified, IV checked, site marked, risks and benefits discussed, surgical/procedural consents, equipment checked, pre-op evaluation, timeout performed, anesthesia consent given, oxygen available, monitors applied/VS acknowledged, fire risk safety assessment completed and verbalized and blood product R/B/A discussed and consented  Peripheral Block   Patient position: supine  Prep: ChloraPrep  Provider prep: mask and sterile gloves  Patient monitoring: cardiac monitor, continuous pulse ox, frequent blood pressure checks, continuous capnometry, IV access, responsive to questions and oxygen  Block type: Sciatic  Popliteal  Laterality: right  Injection technique: single-shot  Guidance: ultrasound guided    Needle   Needle type: insulated echogenic nerve stimulator needle   Needle gauge: 20 G  Needle localization: ultrasound guidance  Needle length: 8 cm  Assessment   Injection assessment: negative aspiration for heme, no paresthesia on injection, local visualized surrounding nerve on ultrasound and no intravascular symptoms  Hemodynamics: stable  Outcomes: uncomplicated and patient tolerated procedure well    Medications Administered  ropivacaine (NAROPIN) injection 0.5% - Perineural   20 mL - 7/8/2024 3:57:00 PM

## 2024-07-08 NOTE — ED NOTES
TRANSFER - OUT REPORT:    Verbal report given to LIZZY Kuo  being transferred to PACU/OR for routine progression of patient care       Report consisted of patient's Situation, Background, Assessment and   Recommendations(SBAR).     Information from the following report(s) ED SBAR was reviewed with the receiving nurse.    Java Fall Assessment:    Presents to emergency department  because of falls (Syncope, seizure, or loss of consciousness): Yes  Age > 70: No  Altered Mental Status, Intoxication with alcohol or substance confusion (Disorientation, impaired judgment, poor safety awaremess, or inability to follow instructions): No  Impaired Mobility: Ambulates or transfers with assistive devices or assistance; Unable to ambulate or transer.: Yes  Nursing Judgement: Yes          Lines:   Peripheral IV 07/08/24 Left Hand (Active)        Opportunity for questions and clarification was provided.      Patient transported with:  Tech

## 2024-07-08 NOTE — ED PROVIDER NOTES
fracture of right ankle, initial encounter          DISPOSITION/PLAN   DISPOSITION Admitted 07/08/2024 10:48:26 AM    Admit Note: Pt is being admitted by javy. The results of their tests and reason(s) for their admission have been discussed with pt and/or available family. They convey agreement and understanding for the need to be admitted and for the admission diagnosis.     PATIENT REFERRED TO:  No follow-up provider specified.      DISCHARGE MEDICATIONS:     Medication List        ASK your doctor about these medications      ALPRAZolam 0.25 MG tablet  Commonly known as: XANAX     aspirin 81 MG EC tablet     Calcium Carbonate-Vitamin D 600-3.125 MG-MCG Tabs     estradiol 1 MG tablet  Commonly known as: ESTRACE     fluocinonide 0.05 % cream  Commonly known as: LIDEX     fluticasone 50 MCG/ACT nasal spray  Commonly known as: FLONASE     loratadine 10 MG tablet  Commonly known as: CLARITIN     potassium gluconate 550 mg tablet     primidone 50 MG tablet  Commonly known as: MYSOLINE     propranolol 10 MG tablet  Commonly known as: INDERAL     triamterene-hydroCHLOROthiazide 37.5-25 MG per tablet  Commonly known as: MAXZIDE-25                DISCONTINUED MEDICATIONS:  Current Discharge Medication List          I am the Primary Clinician of Record. Olivia Espino MD (electronically signed)    (Please note that parts of this dictation were completed with voice recognition software. Quite often unanticipated grammatical, syntax, homophones, and other interpretive errors are inadvertently transcribed by the computer software. Please disregards these errors. Please excuse any errors that have escaped final proofreading.)        Olivia Espino MD  07/08/24 3139

## 2024-07-08 NOTE — OP NOTE
Operative Note      Patient: Alisson Kuo  YOB: 1945  MRN: [id number]    Date of Procedure: 7/8/2024    Pre-Op Diagnosis Codes:     * Closed nondisplaced trimalleolar fracture of right ankle, initial encounter [S82.854A]    Post-Op Diagnosis: Same       Procedure(s):  ANKLE OPEN REDUCTION INTERNAL FIXATION ON RIGHT  .    Surgeon(s):  Kirsten Chen MD    Assistant:   Surgical Assistant: Real Romeo    Anesthesia: General    Estimated Blood Loss (mL): less than 50     Complications: None    Specimens:   * No specimens in log *    Implants:  Implant Name Type Inv. Item Serial No.  Lot No. LRB No. Used Action   SYSTEM IMPL TI UHMWPE KNOTLESS FOR SYNDESMOSIS FIX - [id number]  SYSTEM IMPL TI UHMWPE KNOTLESS FOR SYNDESMOSIS FIX  [employer]- [id number] Right 1 Implanted   SCREW BNE L14MM DIA3.5MM YOSSI TI ST NONLOCKING HD LO PROF - [id number]  SCREW BNE L14MM DIA3.5MM YOSSI TI ST NONLOCKING HD LO PROF  [employer]  Right 2 Implanted   SCREW BNE L12MM DIA3.5MM ANK TI LO PROF - [id number]  SCREW BNE L12MM DIA3.5MM ANK TI LO PROF  [employer]  Right 1 Implanted   SCREW BNE L20MM DIA3.5MM YOSSI TI ST NONLOCKING HD LO PROF - [id number]  SCREW BNE L20MM DIA3.5MM YOSSI TI ST NONLOCKING HD LO PROF  [employer]-  Right 1 Implanted   PLATE BONE RT DSTL FIB TI LCK 6H - [id number]  PLATE BONE RT DSTL FIB TI LCK 6H  [employer]-  Right 1 Implanted   SCREW KRULOCK 3.0 X 20 - [id number]  SCREW KRULOCK 3.0 X 20  [employer]-  Right 1 Implanted   SCREW KRULOCK 3.0 X 16 - [id number]  SCREW KRULOCK 3.0 X 16  [employer]-  Right 2 Implanted   SCREW NEYMAR KREULOCK T1 3.0 X 14 - [id number]  SCREW NEYMAR KREULOCK T1 3.0 X 14  [employer]-  Right 1 Implanted   SCREW KRULOCK 3.0 X 18 - [id number]  SCREW KRULOCK 3.0 X 18  [employer]  Right 1 Implanted         Drains: * No LDAs found *    Findings:  Infection Present At Time Of Surgery (PATOS) (choose all levels that have

## 2024-07-08 NOTE — ANESTHESIA PROCEDURE NOTES
Peripheral Block    Patient location during procedure: holding area  Reason for block: at surgeon's request  Start time: 7/8/2024 4:02 PM  End time: 7/8/2024 4:12 PM  Staffing  Performed: resident/CRNA   Anesthesiologist: Felix Benoit Jr., MD  Resident/CRNA: Rachna Gonzalez APRN - CRNA  Performed by: Rachna Gonzalez APRN - CRNA  Authorized by: Rachna Gonzalez APRN - CRNA    Preanesthetic Checklist  Completed: patient identified, IV checked, site marked, risks and benefits discussed, surgical/procedural consents, equipment checked, pre-op evaluation, timeout performed, anesthesia consent given, oxygen available, monitors applied/VS acknowledged, fire risk safety assessment completed and verbalized and blood product R/B/A discussed and consented  Peripheral Block   Patient position: supine  Prep: ChloraPrep  Provider prep: mask and sterile gloves  Patient monitoring: cardiac monitor, continuous pulse ox, continuous capnometry, frequent blood pressure checks, IV access, oxygen and responsive to questions  Block type: Femoral  Adductor canal  Laterality: right  Injection technique: single-shot  Guidance: ultrasound guided    Needle   Needle type: insulated echogenic nerve stimulator needle   Needle gauge: 20 G  Needle localization: ultrasound guidance  Needle length: 8 cm  Assessment   Injection assessment: negative aspiration for heme, no paresthesia on injection, local visualized surrounding nerve on ultrasound and no intravascular symptoms  Hemodynamics: stable  Outcomes: uncomplicated and patient tolerated procedure well    Medications Administered  ropivacaine (NAROPIN) injection 0.5% - Perineural   10 mL - 7/8/2024 4:12:00 PM

## 2024-07-08 NOTE — ED NOTES
Patient to OR with OR TECH. NS remains infusing into patient line. Purwick detached. Chart on patients bed, belongings in green bag on bed with patient. Complaining of left leg cramps. Vitals remain within normal limits

## 2024-07-08 NOTE — INTERVAL H&P NOTE
Update History & Physical    The patient's History and Physical of July 8, 2024 was reviewed with the patient and I examined the patient. There was no change. The surgical site was confirmed by the patient and me.     Plan: The risks, benefits, expected outcome, and alternative to the recommended procedure have been discussed with the patient. Patient understands and wants to proceed with the procedure.     Electronically signed by Kirsten Chen MD on 7/8/2024 at 3:02 PM

## 2024-07-08 NOTE — ANESTHESIA PRE PROCEDURE
Department of Anesthesiology  Preprocedure Note       Name:  Alisson Kuo   Age:  78 y.o.  :  1945                                          MRN:  [id number]         Date:  2024      Surgeon: Surgeon(s):  Kirsten Chen MD    Procedure: Procedure(s):  ANKLE OPEN REDUCTION INTERNAL FIXATION ON RIGHT VS. EXTERNAL FIXATION  .    Medications prior to admission:   Prior to Admission medications    Medication Sig Start Date End Date Taking? Authorizing Provider   ALPRAZolam (XANAX) 0.25 MG tablet Take by mouth.    Automatic Reconciliation, Ar   aspirin 81 MG EC tablet Take by mouth daily    Automatic Reconciliation, Ar   Calcium Carbonate-Vitamin D 600-3.125 MG-MCG TABS Take by mouth    Automatic Reconciliation, Ar   estradiol (ESTRACE) 1 MG tablet Take 1 tablet by mouth daily 22   Automatic Reconciliation, Ar   fluocinonide (LIDEX) 0.05 % cream Apply topically 2 times daily    Automatic Reconciliation, Ar   fluticasone (FLONASE) 50 MCG/ACT nasal spray 2 sprays by Nasal route daily    Automatic Reconciliation, Ar   loratadine (CLARITIN) 10 MG tablet Take 1 tablet by mouth    Automatic Reconciliation, Ar   potassium gluconate 550 mg tablet Take 99 mg by mouth daily    Automatic Reconciliation, Ar   primidone (MYSOLINE) 50 MG tablet Take by mouth 3 times daily    Automatic Reconciliation, Ar   propranolol (INDERAL) 10 MG tablet Take by mouth 3 times daily    Automatic Reconciliation, Ar   triamterene-hydroCHLOROthiazide (MAXZIDE-25) 37.5-25 MG per tablet Take by mouth daily    Automatic Reconciliation, Ar       Current medications:    Current Facility-Administered Medications   Medication Dose Route Frequency Provider Last Rate Last Admin   • 0.9 % sodium chloride infusion   IntraVENous Continuous Otto Jade MD 75 mL/hr at 24 1158 New Bag at 24 1158   • sodium chloride flush 0.9 % injection 5-40 mL  5-40 mL IntraVENous 2 times per day Otto Jade MD   10 mL at

## 2024-07-08 NOTE — CONSULTS
ORTHOPEDIC CONSULT    Patient: Alisson Kuo MRN: [id number]  SSN: [id number]    YOB: 1945  Age: 78 y.o.  Sex: female      Subjective:      Alisson Kuo is a 78 y.o. female with PMHX of HTN, Hypercholesterolemia, Tremor, who is being seen for right ankle pain.  She reports at around 6 AM this morning while going to the bathroom she lost her balance causing her to fall injuring her right ankle.  She reports she hurt her ankle snap during this incident.  She was unable to bear weight following the injury.  She was transported to [facility] emergency department for further evaluation.  While in EMS it was noted that she had a right ankle deformity however pulses were intact.  X-rays were obtained while in the emergency department which revealed a right ankle fracture dislocation.  2 reduction attempts were attempted while in the emergency department.  Patient was then placed into a posterior sugar-tong splint.  Orthopedics was consulted for further evaluation and management.      Upon evaluation the patient reports she has not eaten or drank anything since last night.  She denies any history of trauma or injury to her right ankle in the past.  She reports she does not ambulate with assistive devices but does have a walker at home.  Reports a similar injury to the left ankle in 2015 which required surgical fixation, but she had no issues with.    Denies any nausea, vomiting, fever, chills, shortness of breath, chest pain, abdominal pain, numbness or tingling.  She reports some left-sided gluteal pain as she thinks she hit that side and she also had to slide down the steps on her butt.  Of note patient reports she takes a 81 mg baby aspirin daily but no other blood thinners.    Past Medical History:   Diagnosis Date    Hypercholesterolemia 8/21/2020    Hypertensive disorder 8/21/2020    Lactose intolerance     Menopause     Obesity     Osteopenia 8/21/2020    Tremor 8/21/2020

## 2024-07-08 NOTE — H&P (VIEW-ONLY)
the above plan.       Signed By: Cha Silveira PA-C     July 8, 2024       Attending Supervising Physician’s Attestation Statement  I saw and evaluated the patient.  I discussed the findings and plans with physician assistant and agree as documented in her note     Patient with unstable trimalleolar ankle fracture after ground level fall with comminuted distal fibula fracture, small posterior malleolus fracture, medial malleolus avulsion fracture.  Discussed that given the fracture pattern and instability I would recommend surgery with right ankle open reduction internal fixation, possible external fixator placement extremity soft tissues do not allow for an open incision.  The risk, benefits and alternatives of procedure were explained to the patient including the risk of infection, bleeding, damage to surrounding nerves, vessels, tendons, need for further procedures, stiffness, chronic pain, wound healing issues, nonunion, malunion, blood clots.  The patient filled out informed consent.    Electronically signed by Kirsten Chen MD on 7/8/24 at 2:58 PM EDT

## 2024-07-09 LAB
ALBUMIN SERPL-MCNC: 2.9 G/DL (ref 3.5–5)
ALBUMIN/GLOB SERPL: 1.1 (ref 1.1–2.2)
ALP SERPL-CCNC: 44 U/L (ref 45–117)
ALT SERPL-CCNC: 12 U/L (ref 12–78)
ANION GAP SERPL CALC-SCNC: 6 MMOL/L (ref 5–15)
AST SERPL W P-5'-P-CCNC: 13 U/L (ref 15–37)
BASOPHILS # BLD: 0.1 K/UL (ref 0–0.1)
BASOPHILS NFR BLD: 1 % (ref 0–1)
BILIRUB SERPL-MCNC: 0.2 MG/DL (ref 0.2–1)
BUN SERPL-MCNC: 21 MG/DL (ref 6–20)
BUN/CREAT SERPL: 23 (ref 12–20)
CA-I BLD-MCNC: 8.4 MG/DL (ref 8.5–10.1)
CHLORIDE SERPL-SCNC: 109 MMOL/L (ref 97–108)
CO2 SERPL-SCNC: 24 MMOL/L (ref 21–32)
CREAT SERPL-MCNC: 0.92 MG/DL (ref 0.55–1.02)
DIFFERENTIAL METHOD BLD: ABNORMAL
EOSINOPHIL # BLD: 0.2 K/UL (ref 0–0.4)
EOSINOPHIL NFR BLD: 2 % (ref 0–7)
ERYTHROCYTE [DISTWIDTH] IN BLOOD BY AUTOMATED COUNT: 13.1 % (ref 11.5–14.5)
GLOBULIN SER CALC-MCNC: 2.6 G/DL (ref 2–4)
GLUCOSE SERPL-MCNC: 122 MG/DL (ref 65–100)
HCT VFR BLD AUTO: 31.9 % (ref 35–47)
HGB BLD-MCNC: 10.3 G/DL (ref 11.5–16)
IMM GRANULOCYTES # BLD AUTO: 0.1 K/UL (ref 0–0.04)
IMM GRANULOCYTES NFR BLD AUTO: 1 % (ref 0–0.5)
LYMPHOCYTES # BLD: 1.6 K/UL (ref 0.8–3.5)
LYMPHOCYTES NFR BLD: 16 % (ref 12–49)
MCH RBC QN AUTO: 29.1 PG (ref 26–34)
MCHC RBC AUTO-ENTMCNC: 32.3 G/DL (ref 30–36.5)
MCV RBC AUTO: 90.1 FL (ref 80–99)
MONOCYTES # BLD: 1 K/UL (ref 0–1)
MONOCYTES NFR BLD: 10 % (ref 5–13)
NEUTS SEG # BLD: 7 K/UL (ref 1.8–8)
NEUTS SEG NFR BLD: 70 % (ref 32–75)
NRBC # BLD: 0 K/UL (ref 0–0.01)
NRBC BLD-RTO: 0 PER 100 WBC
PLATELET # BLD AUTO: 136 K/UL (ref 150–400)
PMV BLD AUTO: 11.4 FL (ref 8.9–12.9)
POTASSIUM SERPL-SCNC: 3.7 MMOL/L (ref 3.5–5.1)
PROT SERPL-MCNC: 5.5 G/DL (ref 6.4–8.2)
RBC # BLD AUTO: 3.54 M/UL (ref 3.8–5.2)
SODIUM SERPL-SCNC: 139 MMOL/L (ref 136–145)
WBC # BLD AUTO: 9.9 K/UL (ref 3.6–11)

## 2024-07-09 PROCEDURE — 85025 COMPLETE CBC W/AUTO DIFF WBC: CPT

## 2024-07-09 PROCEDURE — 6360000002 HC RX W HCPCS: Performed by: ORTHOPAEDIC SURGERY

## 2024-07-09 PROCEDURE — 80053 COMPREHEN METABOLIC PANEL: CPT

## 2024-07-09 PROCEDURE — 97535 SELF CARE MNGMENT TRAINING: CPT

## 2024-07-09 PROCEDURE — 36415 COLL VENOUS BLD VENIPUNCTURE: CPT

## 2024-07-09 PROCEDURE — 6370000000 HC RX 637 (ALT 250 FOR IP): Performed by: ORTHOPAEDIC SURGERY

## 2024-07-09 PROCEDURE — 99024 POSTOP FOLLOW-UP VISIT: CPT

## 2024-07-09 PROCEDURE — 1100000000 HC RM PRIVATE

## 2024-07-09 PROCEDURE — 2580000003 HC RX 258: Performed by: INTERNAL MEDICINE

## 2024-07-09 PROCEDURE — 97530 THERAPEUTIC ACTIVITIES: CPT

## 2024-07-09 PROCEDURE — 6370000000 HC RX 637 (ALT 250 FOR IP): Performed by: HOSPITALIST

## 2024-07-09 PROCEDURE — 97161 PT EVAL LOW COMPLEX 20 MIN: CPT

## 2024-07-09 PROCEDURE — 97166 OT EVAL MOD COMPLEX 45 MIN: CPT

## 2024-07-09 PROCEDURE — 2580000003 HC RX 258: Performed by: ORTHOPAEDIC SURGERY

## 2024-07-09 PROCEDURE — 6370000000 HC RX 637 (ALT 250 FOR IP): Performed by: INTERNAL MEDICINE

## 2024-07-09 RX ORDER — PRIMIDONE 50 MG/1
100 TABLET ORAL DAILY
Status: DISCONTINUED | OUTPATIENT
Start: 2024-07-09 | End: 2024-07-10 | Stop reason: HOSPADM

## 2024-07-09 RX ORDER — PROPRANOLOL HCL 60 MG
60 CAPSULE, EXTENDED RELEASE 24HR ORAL DAILY
Status: DISCONTINUED | OUTPATIENT
Start: 2024-07-09 | End: 2024-07-10 | Stop reason: HOSPADM

## 2024-07-09 RX ADMIN — SODIUM CHLORIDE: 9 INJECTION, SOLUTION INTRAVENOUS at 18:38

## 2024-07-09 RX ADMIN — OXYCODONE 5 MG: 5 TABLET ORAL at 11:20

## 2024-07-09 RX ADMIN — PRIMIDONE 100 MG: 50 TABLET ORAL at 10:06

## 2024-07-09 RX ADMIN — ASPIRIN 81 MG 81 MG: 81 TABLET ORAL at 09:59

## 2024-07-09 RX ADMIN — ASPIRIN 81 MG 81 MG: 81 TABLET ORAL at 21:50

## 2024-07-09 RX ADMIN — ACETAMINOPHEN 1000 MG: 500 TABLET ORAL at 13:43

## 2024-07-09 RX ADMIN — PROPRANOLOL HYDROCHLORIDE 60 MG: 60 CAPSULE, EXTENDED RELEASE ORAL at 11:21

## 2024-07-09 RX ADMIN — Medication 5 MG: at 00:27

## 2024-07-09 RX ADMIN — CEFAZOLIN 2000 MG: 1 INJECTION, POWDER, FOR SOLUTION INTRAMUSCULAR; INTRAVENOUS at 13:45

## 2024-07-09 RX ADMIN — CEFAZOLIN 2000 MG: 1 INJECTION, POWDER, FOR SOLUTION INTRAMUSCULAR; INTRAVENOUS at 05:40

## 2024-07-09 RX ADMIN — SODIUM CHLORIDE, PRESERVATIVE FREE 10 ML: 5 INJECTION INTRAVENOUS at 21:51

## 2024-07-09 RX ADMIN — ALPRAZOLAM 0.25 MG: 0.25 TABLET ORAL at 10:06

## 2024-07-09 RX ADMIN — ACETAMINOPHEN 1000 MG: 500 TABLET ORAL at 05:40

## 2024-07-09 RX ADMIN — SODIUM CHLORIDE, PRESERVATIVE FREE 10 ML: 5 INJECTION INTRAVENOUS at 09:59

## 2024-07-09 RX ADMIN — ACETAMINOPHEN 1000 MG: 500 TABLET ORAL at 21:49

## 2024-07-09 ASSESSMENT — PAIN DESCRIPTION - ORIENTATION
ORIENTATION: RIGHT

## 2024-07-09 ASSESSMENT — PAIN DESCRIPTION - LOCATION
LOCATION: LEG
LOCATION: ANKLE
LOCATION: HIP
LOCATION: LEG
LOCATION: LEG

## 2024-07-09 ASSESSMENT — PAIN SCALES - GENERAL
PAINLEVEL_OUTOF10: 2
PAINLEVEL_OUTOF10: 3
PAINLEVEL_OUTOF10: 2
PAINLEVEL_OUTOF10: 0
PAINLEVEL_OUTOF10: 2
PAINLEVEL_OUTOF10: 3

## 2024-07-09 ASSESSMENT — PAIN SCALES - WONG BAKER
WONGBAKER_NUMERICALRESPONSE: HURTS A LITTLE BIT
WONGBAKER_NUMERICALRESPONSE: HURTS A LITTLE BIT

## 2024-07-09 ASSESSMENT — PAIN DESCRIPTION - DESCRIPTORS
DESCRIPTORS: ACHING
DESCRIPTORS: DISCOMFORT
DESCRIPTORS: DISCOMFORT
DESCRIPTORS: ACHING

## 2024-07-09 NOTE — CARE COORDINATION
07/09/24 1427   Service Assessment   Patient Orientation Alert and Oriented   Cognition Alert   History Provided By Patient   Primary Caregiver Self   Accompanied By/Relationship Alone at time of assessment   Support Systems Family Members   Patient's Healthcare Decision Maker is: Legal Next of Kin   PCP Verified by CM Yes   Last Visit to PCP Within last 3 months   Prior Functional Level Independent in ADLs/IADLs   Current Functional Level Assistance with the following:;Toileting;Feeding;Cooking;Housework;Shopping;Mobility   Can patient return to prior living arrangement Yes   Ability to make needs known: Good   Family able to assist with home care needs: Yes   Would you like for me to discuss the discharge plan with any other family members/significant others, and if so, who? No   Financial Resources Medicare   Social/Functional History   Lives With Family   Type of Home House   Home Access Stairs to enter with rails   Entrance Stairs - Number of Steps Level entry through garage   Home Equipment Rollator;Walker - Rolling   ADL Assistance Independent   Ambulation Assistance Needs assistance   Occupation Retired   Discharge Planning   History of falls? 1   Services At/After Discharge   Transition of Care Consult (CM Consult) Discharge Planning     Address on file confirmed as accurate. Patient lives with her son and grandson and was independent prior to injury. DME includes 2 rolling walkers and a rollator. Discussed therapy recommendation for SNF and patient declined. She was agreeable to  therapy. Choice letter signed and placed on chart. Did not have preference of agency.    [facility]  has accepted patient. -Anticipated Start of Care 07-     Therapy rec for wheelchair 16 inch. Referral sent to [employer]    Advance Care Planning     General Advance Care Planning (ACP) Conversation    Date of Conversation: 7/9/2024  Conducted with: Patient with Decision Making Capacity  Other persons present:

## 2024-07-09 NOTE — CARE COORDINATION
Cm reviewed chart. Therapy recommending SNF.    CM met f/f with patient for discharge planning. She is not agreeable to SNF for rehab. Prefers to go home with home therapy.    No preference of  agency.  Will need DME.    Referral sent via [employer]

## 2024-07-10 VITALS
WEIGHT: 160 LBS | BODY MASS INDEX: 25.71 KG/M2 | TEMPERATURE: 97.9 F | OXYGEN SATURATION: 95 % | SYSTOLIC BLOOD PRESSURE: 147 MMHG | RESPIRATION RATE: 16 BRPM | HEIGHT: 66 IN | DIASTOLIC BLOOD PRESSURE: 81 MMHG | HEART RATE: 108 BPM

## 2024-07-10 LAB
ALBUMIN SERPL-MCNC: 2.8 G/DL (ref 3.5–5)
ALBUMIN/GLOB SERPL: 0.9 (ref 1.1–2.2)
ALP SERPL-CCNC: 45 U/L (ref 45–117)
ALT SERPL-CCNC: 10 U/L (ref 12–78)
ANION GAP SERPL CALC-SCNC: 5 MMOL/L (ref 5–15)
AST SERPL W P-5'-P-CCNC: 13 U/L (ref 15–37)
BASOPHILS # BLD: 0.1 K/UL (ref 0–0.1)
BASOPHILS NFR BLD: 1 % (ref 0–1)
BILIRUB SERPL-MCNC: 0.3 MG/DL (ref 0.2–1)
BUN SERPL-MCNC: 19 MG/DL (ref 6–20)
BUN/CREAT SERPL: 23 (ref 12–20)
CA-I BLD-MCNC: 8.7 MG/DL (ref 8.5–10.1)
CHLORIDE SERPL-SCNC: 111 MMOL/L (ref 97–108)
CO2 SERPL-SCNC: 23 MMOL/L (ref 21–32)
CREAT SERPL-MCNC: 0.84 MG/DL (ref 0.55–1.02)
DIFFERENTIAL METHOD BLD: ABNORMAL
EOSINOPHIL # BLD: 0.2 K/UL (ref 0–0.4)
EOSINOPHIL NFR BLD: 2 % (ref 0–7)
ERYTHROCYTE [DISTWIDTH] IN BLOOD BY AUTOMATED COUNT: 13.3 % (ref 11.5–14.5)
GLOBULIN SER CALC-MCNC: 3.2 G/DL (ref 2–4)
GLUCOSE SERPL-MCNC: 100 MG/DL (ref 65–100)
HCT VFR BLD AUTO: 31.8 % (ref 35–47)
HGB BLD-MCNC: 10.4 G/DL (ref 11.5–16)
IMM GRANULOCYTES # BLD AUTO: 0.1 K/UL (ref 0–0.04)
IMM GRANULOCYTES NFR BLD AUTO: 1 % (ref 0–0.5)
LYMPHOCYTES # BLD: 1.6 K/UL (ref 0.8–3.5)
LYMPHOCYTES NFR BLD: 16 % (ref 12–49)
MCH RBC QN AUTO: 29.1 PG (ref 26–34)
MCHC RBC AUTO-ENTMCNC: 32.7 G/DL (ref 30–36.5)
MCV RBC AUTO: 88.8 FL (ref 80–99)
MONOCYTES # BLD: 1 K/UL (ref 0–1)
MONOCYTES NFR BLD: 10 % (ref 5–13)
NEUTS SEG # BLD: 7.4 K/UL (ref 1.8–8)
NEUTS SEG NFR BLD: 70 % (ref 32–75)
NRBC # BLD: 0 K/UL (ref 0–0.01)
NRBC BLD-RTO: 0 PER 100 WBC
PLATELET # BLD AUTO: 148 K/UL (ref 150–400)
PMV BLD AUTO: 11.8 FL (ref 8.9–12.9)
POTASSIUM SERPL-SCNC: 3.7 MMOL/L (ref 3.5–5.1)
PROT SERPL-MCNC: 6 G/DL (ref 6.4–8.2)
RBC # BLD AUTO: 3.58 M/UL (ref 3.8–5.2)
SODIUM SERPL-SCNC: 139 MMOL/L (ref 136–145)
WBC # BLD AUTO: 10.4 K/UL (ref 3.6–11)

## 2024-07-10 PROCEDURE — 85025 COMPLETE CBC W/AUTO DIFF WBC: CPT

## 2024-07-10 PROCEDURE — 80053 COMPREHEN METABOLIC PANEL: CPT

## 2024-07-10 PROCEDURE — 2580000003 HC RX 258: Performed by: INTERNAL MEDICINE

## 2024-07-10 PROCEDURE — 97530 THERAPEUTIC ACTIVITIES: CPT

## 2024-07-10 PROCEDURE — 6370000000 HC RX 637 (ALT 250 FOR IP): Performed by: HOSPITALIST

## 2024-07-10 PROCEDURE — 99024 POSTOP FOLLOW-UP VISIT: CPT

## 2024-07-10 PROCEDURE — 36415 COLL VENOUS BLD VENIPUNCTURE: CPT

## 2024-07-10 PROCEDURE — 6370000000 HC RX 637 (ALT 250 FOR IP): Performed by: INTERNAL MEDICINE

## 2024-07-10 PROCEDURE — 6370000000 HC RX 637 (ALT 250 FOR IP): Performed by: ORTHOPAEDIC SURGERY

## 2024-07-10 RX ORDER — OXYCODONE HYDROCHLORIDE 5 MG/1
5 TABLET ORAL EVERY 4 HOURS PRN
Qty: 35 TABLET | Refills: 0 | Status: SHIPPED | OUTPATIENT
Start: 2024-07-10 | End: 2024-07-16

## 2024-07-10 RX ADMIN — Medication 5 MG: at 00:22

## 2024-07-10 RX ADMIN — ACETAMINOPHEN 1000 MG: 500 TABLET ORAL at 05:59

## 2024-07-10 RX ADMIN — ALPRAZOLAM 0.25 MG: 0.25 TABLET ORAL at 03:04

## 2024-07-10 RX ADMIN — TRAMADOL HYDROCHLORIDE 50 MG: 50 TABLET ORAL at 04:26

## 2024-07-10 RX ADMIN — ACETAMINOPHEN 1000 MG: 500 TABLET ORAL at 15:06

## 2024-07-10 RX ADMIN — SODIUM CHLORIDE: 9 INJECTION, SOLUTION INTRAVENOUS at 02:23

## 2024-07-10 RX ADMIN — ASPIRIN 81 MG 81 MG: 81 TABLET ORAL at 09:46

## 2024-07-10 RX ADMIN — SODIUM CHLORIDE, PRESERVATIVE FREE 10 ML: 5 INJECTION INTRAVENOUS at 09:46

## 2024-07-10 RX ADMIN — PROPRANOLOL HYDROCHLORIDE 60 MG: 60 CAPSULE, EXTENDED RELEASE ORAL at 15:06

## 2024-07-10 RX ADMIN — ALPRAZOLAM 0.25 MG: 0.25 TABLET ORAL at 09:46

## 2024-07-10 RX ADMIN — PRIMIDONE 100 MG: 50 TABLET ORAL at 09:46

## 2024-07-10 ASSESSMENT — PAIN DESCRIPTION - DESCRIPTORS
DESCRIPTORS: ACHING;DISCOMFORT
DESCRIPTORS: DISCOMFORT
DESCRIPTORS: ACHING

## 2024-07-10 ASSESSMENT — PAIN SCALES - GENERAL
PAINLEVEL_OUTOF10: 4
PAINLEVEL_OUTOF10: 4

## 2024-07-10 ASSESSMENT — PAIN DESCRIPTION - LOCATION
LOCATION: LEG

## 2024-07-10 ASSESSMENT — PAIN DESCRIPTION - ORIENTATION
ORIENTATION: RIGHT

## 2024-07-10 NOTE — DISCHARGE SUMMARY
Alisson Kuo 1945 [id number]  PCP:  Rl Leija MD    Admit date: 7/8/2024  Admitting Physician: Otto Jade MD    Discharge date: 7/10/2024 Discharge Physician: Yusef Moore MD      Reason for admission:   Chief Complaint   Patient presents with    Fall    Ankle Pain     Present on Admission:   Ankle fracture, right, closed, initial encounter       Discharge Diagnoses & Hospital Course::     Right ankle fracture/dislocation  Hypertension  Dyslipidemia  Osteopenia  Chronic normocytic anemia    78-year-old female lost her balance at home, fell to the ground fracturing her right ankle for which she presents to the ER.  She was found to have fractured her lateral malleolus, with posterior lateral tibiotalar subluxation.  On 7/8/2024, she had an ankle open reduction and internal fixation on the right done which she tolerated.  Patient advised to be nonweightbearing on the right lower extremity and maintain in the posterior splint.  Pain control achieved.  She received preoperative antibiotics with Ancef 2000 mg x 3 doses.  DVT prophylaxis with aspirin 81 mg twice daily.  PT OT evaluation and treatment.  Recommendation was for short-term rehab but patient elected to be discharged home with home health.  Patient discharged home to follow-up with orthopedic surgery in 2 weeks to remove sutures and then may be transition to removable cam boot.  The rest of her chronic medical conditions were managed appropriately.  Patient discharged home.    Patient was feeling and looking better on the day of discharge.  Patient was discharged in a stable condition with following medications and instructions.    Procedures: Right ankle ORIF        Exam:   Wt Readings from Last 3 Encounters:   07/08/24 72.6 kg (160 lb)   07/05/23 78 kg (172 lb)   06/29/21 79.8 kg (176 lb)       Blood pressure 126/77, pulse 73, temperature 98.2 °F (36.8 °C), temperature source Oral, resp. rate 18, height 1.676 m (5' 6\"), weight 72.6 kg

## 2024-07-10 NOTE — CARE COORDINATION
Cm reviewed chart. POD#2 s/p right ankle ORIF.     Patient declined SNF. [facility] accepted patient for post hospital care.    DCP: home with family care and [facility]     Transition of Care Plan:    RUR: 14%  Prior Level of Functioning: Independent  Disposition: home with home health   If SNF or IPR: Date FOC offered: Declined SNF  Date FOC received:   Accepting facility:   Date authorization started with reference number:   Date authorization received and expires:   Follow up appointments: Discharge summary  DME needed: Owns rolling walker  Transportation at discharge: Son  IM/IMM Medicare/ letter given: N/A (1st given on 7/9/24)  Is patient a [place] and connected with [facility]? N/A   If yes, was  transfer form completed and [facility] notified?   Caregiver Contact: Per patient  Discharge Caregiver contacted prior to discharge? Per patient  Care Conference needed? N/A  Barriers to discharge: N/A

## 2024-07-10 NOTE — PLAN OF CARE
Problem: ABCDS Injury Assessment  Goal: Absence of physical injury  Outcome: Progressing     Problem: Pain  Goal: Verbalizes/displays adequate comfort level or baseline comfort level  Outcome: Progressing     Problem: Safety - Adult  Goal: Free from fall injury  Outcome: Progressing     Problem: Discharge Planning  Goal: Discharge to home or other facility with appropriate resources  Outcome: Progressing     
  Problem: Pain  Goal: Verbalizes/displays adequate comfort level or baseline comfort level  7/10/2024 0802 by Mei Diop LPN  Outcome: Progressing  7/10/2024 0033 by Walter Moore RN  Outcome: Progressing     Problem: Safety - Adult  Goal: Free from fall injury  7/10/2024 0802 by Mei Diop LPN  Outcome: Progressing  7/10/2024 0033 by Walter Moore RN  Outcome: Progressing     Problem: Discharge Planning  Goal: Discharge to home or other facility with appropriate resources  7/10/2024 0033 by Walter Moore, RN  Outcome: Progressing     
  Problem: Pain  Goal: Verbalizes/displays adequate comfort level or baseline comfort level  7/9/2024 0805 by Mei Diop LPN  Outcome: Progressing  7/8/2024 2234 by Sarah Diggs, RN  Outcome: Progressing     Problem: Safety - Adult  Goal: Free from fall injury  7/9/2024 0805 by Mei Diop LPN  Outcome: Progressing  7/8/2024 2234 by Sarah Diggs, RN  Outcome: Progressing     
  Problem: Pain  Goal: Verbalizes/displays adequate comfort level or baseline comfort level  Outcome: Progressing     Problem: ABCDS Injury Assessment  Goal: Absence of physical injury  Outcome: Progressing     Problem: Safety - Adult  Goal: Free from fall injury  Outcome: Progressing     Problem: Discharge Planning  Goal: Discharge to home or other facility with appropriate resources  Outcome: Progressing     Problem: Skin/Tissue Integrity  Goal: Absence of new skin breakdown  Description: 1.  Monitor for areas of redness and/or skin breakdown  2.  Assess vascular access sites hourly  3.  Every 4-6 hours minimum:  Change oxygen saturation probe site  4.  Every 4-6 hours:  If on nasal continuous positive airway pressure, respiratory therapy assess nares and determine need for appliance change or resting period.  Outcome: Progressing     
OCCUPATIONAL THERAPY EVALUATION  Patient: Alisson Kuo (78 y.o. female)  Date: 7/9/2024  Primary Diagnosis: Closed fracture of right ankle, initial encounter [S82.891A]  Ankle fracture, right, closed, initial encounter [S82.891A]  Ankle dislocation, right, initial encounter [S93.04XA]  Procedure(s) (LRB):  ANKLE OPEN REDUCTION INTERNAL FIXATION ON RIGHT (Right)  . (Right) 1 Day Post-Op   Precautions: Fall Risk, General Precautions, Weight Bearing Right Lower Extremity Weight Bearing: Non Weight Bearing              Recommendations for nursing mobility: Out of bed to chair for meals, Use of BSC for toileting , and Assist x1    In place during session:Peripheral IV  ASSESSMENT  Pt is a 78 y.o. female [facility] due to a fall was admitted 7/8/2024 and currently being treated for Closed Fracture Right Ankle and S/P ORIF and NWB status. Pt received semi-supine in bed upon arrival, A & O x 4, and agreeable to OT evaluation.     Based on current observations, pt presents with decreased  functional mobility, independence in ADLs, activity tolerance, endurance, balance (see below for objective details and assist levels).     Overall, pt tolerated session when given extra time due to tremors with exertion coupled with cues to adhere to NWB status for her RLE. After set-up, she is able to Independently eat, wash her face and upper body, and perform grooming tasks without any difficulty. Due to her recent RLE ORIF, she requires max for lower body dressing and bathing and assistance for toileting  She has appropriate AE at home (reacher, sock aid, and long shoe pena), DME ([employer] Rolling Walker) and a supportive son. Pt will benefit from continued skilled OT services to address current impairments and improve IND and safety with self cares and functional transfers/mobility. Potential barriers for safe discharge: Ms Kuo is not safe to return home at this time and left alone due to recent NWB status for 
Level: Oriented X4  Cognition  Overall Cognitive Status: WNL    Functional Mobility Training:  Bed Mobility:  Bed Mobility Training  Bed Mobility Training: Yes  Interventions: Verbal cues  Rolling: Stand-by assistance  Supine to Sit: Minimum assistance;Assist X1;Additional time  Scooting: Contact-guard assistance;Additional time  Transfers:  Transfer Training  Transfer Training: Yes  Interventions: Verbal cues;Tactile cues;Safety awareness training;Weight shifting training/pressure relief;Visual cues;Demonstration  Sit to Stand: Minimum assistance;Assist X1;Additional time;Adaptive equipment  Stand to Sit: Minimum assistance;Additional time;Adaptive equipment  Bed to Chair: Minimum assistance;Additional time;Assist X1;Adaptive equipment  Toilet Transfer: Minimum assistance;Additional time;Adaptive equipment;Moderate assistance (to BS)    Pt. Urinated in floor  immediately upon  standing  Assisted pt. With cleaning area and pt. Changed gown and all linens   Additional time needed for changing/cleaning pt. Pt. required total assist for xiomara care. PCT in to assist as well. Pt. With this occurring several times due to incontinence.     Balance:  Balance  Sitting: Intact  Standing: Impaired  Standing - Static: Constant support;Fair  Standing - Dynamic: Constant support;Poor;Fair  Ambulation/Gait Training:        Right Side Weight Bearing: Non-weight bearing               Gait  Gait Training: Yes  Right Side Weight Bearing: Non-weight bearing  Overall Level of Assistance: Minimum assistance;Additional time  Distance (ft): 5 Feet  Assistive Device: Gait belt;Walker, rolling  Interventions: Verbal cues;Tactile cues;Safety awareness training;Weight shifting training/pressure relief;Demonstration;Visual cues  Speed/Sheila: Slow  Gait Abnormalities: Decreased step clearance           Therapeutic Exercises:       EXERCISE   Sets   Reps   Active Active Assist   Passive Self ROM   Comments   Ankle Pumps  20 [x] [] [] [] L LE 
  bed mobility , EOB transfers, OOB transfers, amb with AD, and standing static and dynamic balance    Functional Measure:  [facility] AM-PAC™ “6 Clicks”         Basic Mobility Inpatient Short Form  How much difficulty does the patient currently have... Unable A Lot A Little None   1.  Turning over in bed (including adjusting bedclothes, sheets and blankets)?   [] 1   [] 2   [] 3   [x] 4   2.  Sitting down on and standing up from a chair with arms ( e.g., wheelchair, bedside commode, etc.)   [] 1   [] 2   [x] 3   [] 4   3.  Moving from lying on back to sitting on the side of the bed?   [] 1   [] 2   [x] 3   [] 4          How much help from another person does the patient currently need... Total A Lot A Little None   4.  Moving to and from a bed to a chair (including a wheelchair)?   [] 1   [] 2   [x] 3   [] 4   5.  Need to walk in hospital room?   [] 1   [] 2   [x] 3   [] 4   6.  Climbing 3-5 steps with a railing?   [] 1   [x] 2   [] 3   [] 4   © , Trustees of [facility], under license to [employer]. All rights reserved     Score:  Initial:  Most Recent: X (Date: 2024 )   Interpretation of Tool:  Represents activities that are increasingly more difficult (i.e. Bed mobility, Transfers, Gait).  Score 24 23 22-20 19-15 14-10 9-7 6   Modifier CH CI CJ CK CL CM CN         Physical Therapy Evaluation Charge Determination   History Examination Presentation Decision-Making   HIGH Complexity :3+ comorbidities / personal factors will impact the outcome/ POC  MEDIUM Complexity : 3 Standardized tests and measures addressing body structure, function, activity limitation and / or participation in recreation  LOW Complexity : Stable, uncomplicated  Other outcome measures [facility] 6  MEDIUM      Based on the above components, the patient evaluation is determined to be of the following complexity level: LOW    Pain Ratin/10 R ankle pain with mobility  Pain Intervention(s):   pain is at a level

## 2024-07-10 NOTE — DISCHARGE INSTR - COC
Continuity of Care Form    Patient Name: Alisson Kuo   :  1945  MRN:  [id number]    Admit date:  2024  Discharge date:  07/10/2024    Code Status Order: Full Code   Advance Directives:   Advance Care Flowsheet Documentation       Date/Time Healthcare Directive Type of Healthcare Directive Copy in Chart Healthcare Agent Appointed Healthcare Agent's Name Healthcare Agent's Phone Number    24 1439 No, patient does not have an advance directive for healthcare treatment -- -- -- -- --            Admitting Physician:  Otto Jade MD  PCP: Rl Leija MD    Discharging Nurse: Mei Diop LPN  Discharging Hospital Unit/Room#: 512/01   Emergency Contact:   Extended Emergency Contact Information  Primary Emergency Contact: Fay Morgan  Home Phone: [phone]  Relation: Child  Secondary Emergency Contact: Edmundo Stockton  [employer] Phone: [phone]  Relation: Child   needed? No    Past Surgical History:  Past Surgical History:   Procedure Laterality Date    ANKLE FRACTURE SURGERY Right 2024    ANKLE OPEN REDUCTION INTERNAL FIXATION ON RIGHT performed by Kirsten Chen MD at [facility] MAIN OR    ANKLE SURGERY Right 2024    . performed by Kirsten Chen MD at [facility] MAIN OR    CHOLECYSTECTOMY      COLONOSCOPY      CYST INCISION AND DRAINAGE      GI  2016    Colonoscopy    HYSTERECTOMY (CERVIX STATUS UNKNOWN)         Immunization History:     There is no immunization history on file for this patient.    Active Problems:  Patient Active Problem List   Diagnosis Code    Ingrown toenail of left foot L60.0    Hypertensive disorder I10    Paronychia of great toe of left foot L03.032    Menopause Z78.0    Osteopenia M85.80    Tremor R25.1    Hypercholesterolemia E78.00    Ankle fracture, right, closed, initial encounter S82.891A       Isolation/Infection:   Isolation            No Isolation          Patient Infection Status       None to display            Nurse

## 2024-07-10 NOTE — PROGRESS NOTES
History & Physical    Primary Care Provider: Rl Leija MD  Source of Information: Patient/family     Chief complaint:   Chief Complaint   Patient presents with    Fall    Ankle Pain        History of Presenting Illness:   Alisson Kuo is a 78 y.o. female with multiple medical problems including tremors, osteopenia, hypercholesteremia, hypertension, and obesity.  She was using the bathroom and suddenly lost her balance and tried to brace herself but then she fell and fractured her right leg.  She she denied any dizziness prior or hitting her head upon fall . She reports that she takes 81 mg of Aspirin but is otherwise not anticoagulated. Patient notes that she often loses her balance and falls.  She was taken to the ED by EMS.  Right ankle x-ray revealed acute lateral malleolus fracture.  Posterior lateral tibiotalar subluxation.  Repeat x-ray revealed interval casting and posterior dislocation of the talus.  On evaluation, she reports pain that is 3/10 on intensity. Her ankle was splinted and reports some associated numbness. No chest pain, headaches or SOB was noted.        Review of Systems:  A comprehensive review of systems was negative except for that written in the History of Present Illness.     Past Medical History:   Diagnosis Date    Hypercholesterolemia 8/21/2020    Hypertensive disorder 8/21/2020    Lactose intolerance     Menopause     Obesity     Osteopenia 8/21/2020    Tremor 8/21/2020        Past Surgical History:   Procedure Laterality Date    CHOLECYSTECTOMY      COLONOSCOPY      CYST INCISION AND DRAINAGE      GI  2016    Colonoscopy    HYSTERECTOMY (CERVIX STATUS UNKNOWN)     DBS box    Prior to Admission medications    Medication Sig Start Date End Date Taking? Authorizing Provider   ALPRAZolam (XANAX) 0.25 MG tablet Take by mouth.    Automatic Reconciliation, Ar   aspirin 81 MG EC tablet Take by mouth daily    Automatic Reconciliation, Ar   Calcium Carbonate-Vitamin D 600-3.125 
..4 Eyes Skin Assessment     NAME:  Alisson Kuo  YOB: 1945  MEDICAL RECORD NUMBER:  [id number]    The patient is being assessed for  Admission    I agree that at least one RN has performed a thorough Head to Toe Skin Assessment on the patient. ALL assessment sites listed below have been assessed.      Areas assessed by both nurses: Surgical dressing to right lower leg consisting of sutures 4x4 and cast padding ace splint    Head, Face, Ears, Shoulders, Back, Chest, Arms, Elbows, Hands, Sacrum. Buttock, Coccyx, Ischium, and Legs. Feet and Heels        Does the Patient have a Wound? No noted wound(s)       Rafael Prevention initiated by RN: Yes  Wound Care Orders initiated by RN: Yes    Pressure Injury (Stage 3,4, Unstageable, DTI, NWPT, and Complex wounds) if present, place Wound referral order by RN under : No    New Ostomies, if present place, Ostomy referral order under : No     Nurse 1 eSignature: Electronically signed by LORETTA WALSH LPN on 7/8/24 at 8:09 PM EDT    **SHARE this note so that the co-signing nurse can place an eSignature**    Nurse 2 eSignature: Electronically signed by Emerald Doherty RN on 7/8/24 at 8:10 PM EDT     
..4 Eyes Skin Assessment     NAME:  Alisson Kuo  YOB: 1945  MEDICAL RECORD NUMBER:  [id number]    The patient is being assessed for  Other Weekly skin assessment    I agree that at least one RN has performed a thorough Head to Toe Skin Assessment on the patient. ALL assessment sites listed below have been assessed.      Areas assessed by both nurses:  Surgical dressing to righ lower extremity    Head, Face, Ears, Shoulders, Back, Chest, Arms, Elbows, Hands, Sacrum. Buttock, Coccyx, Ischium, and Legs. Feet and Heels        Does the Patient have a Wound? No noted wound(s)       Rafael Prevention initiated by RN: Yes  Wound Care Orders initiated by RN: Yes    Pressure Injury (Stage 3,4, Unstageable, DTI, NWPT, and Complex wounds) if present, place Wound referral order by RN under : No    New Ostomies, if present place, Ostomy referral order under : No     Nurse 1 eSignature: Electronically signed by LORETTA WALSH LPN on 7/10/24 at 3:54 PM EDT    **SHARE this note so that the co-signing nurse can place an eSignature**    Nurse 2 eSignature: {Esignature:[id number]}   
4 Eyes Skin Assessment     NAME:  Alisson Kuo  YOB: 1945  MEDICAL RECORD NUMBER:  [id number]    The patient is being assessed for  Admission    I agree that at least one RN has performed a thorough Head to Toe Skin Assessment on the patient. ALL assessment sites listed below have been assessed.      Areas assessed by both nurses:    Head, Face, Ears, Shoulders, Back, Chest, Arms, Elbows, Hands, Sacrum. Buttock, Coccyx, Ischium, Legs. Feet and Heels, and Under Medical Devices         Does the Patient have a Wound? Yes wound(s) were present on assessment. LDA wound assessment was Initiated and completed by RN       Rafael Prevention initiated by RN: Yes  Wound Care Orders initiated by RN: No    Pressure Injury (Stage 3,4, Unstageable, DTI, NWPT, and Complex wounds) if present, place Wound referral order by RN under : No    New Ostomies, if present place, Ostomy referral order under : No     Nurse 1 eSignature: Electronically signed by Sarah Diggs RN on 7/8/24 at 10:46 PM EDT    **SHARE this note so that the co-signing nurse can place an eSignature**    Nurse 2 eSignature: Electronically signed by Marisa Sauer RN on 7/9/24 at 12:33 AM EDT   
Assisted back to bed X2 person assist with the walker.  
OCCUPATIONAL THERAPY TREATMENT  Patient: Alisson Kuo (78 y.o. female)  Date: 7/10/2024  Primary Diagnosis: Closed fracture of right ankle, initial encounter [S82.891A]  Ankle fracture, right, closed, initial encounter [S82.891A]  Ankle dislocation, right, initial encounter [S93.04XA]  Procedure(s) (LRB):  ANKLE OPEN REDUCTION INTERNAL FIXATION ON RIGHT (Right)  . (Right) 2 Days Post-Op   Precautions: Fall Risk, General Precautions, Weight Bearing Right Lower Extremity Weight Bearing: Non Weight Bearing              Recommendations for nursing mobility: Out of bed to chair for meals, Encourage HEP in prep for ADLs/mobility; see handout for details, Frequent repositioning to prevent skin breakdown, Use of BSC for toileting , AD and gt belt for bed to chair , and Assist x1    In place during session: Peripheral IV and External Catheter  Chart, occupational therapy assessment, plan of care, and goals were reviewed.  ASSESSMENT  Patient continues with skilled OT services and is progressing towards goals. Pt semi supine in bed upon DELEON arrival, agreeable to session. Pt A&O x 4. Pt reported that she just got back to bed and requested to remain semi supine in bed.  Pt completed UE therex, see grid below for details, to maintain/ increase strength and endurance to aid in adl performance. Pt talkative and spoke about several family member that had or attempted suicide. Pt stated that she had no suicidal thoughts and that she would talk to some one if she ever did.  Reported conversation to RN. Education provided on energy conversation, safety awareness and HEP w/ pt verbalizing good understanding. Pt encouraged to complete HEP 3 times per day to aid in recovery. (See below for objective details and assist levels).     Overall pt tolerated session fair today with rest breaks. Potential barriers for safe discharge: pts current support system is unable to meet their requirements for physical assistance, pt is a high fall 
Orthopedic Progress Note    Date:2024       Room:[facility]  Patient Name:Alisson Kuo     YOB: 1945     Age:78 y.o.  female     SUBJECTIVE    2024: POD #1 s/p right ankle ORIF.  Patient reports she is doing well since surgery.  She reports no pain at this time.  However she reports numbness to the toes and inability to wiggle the toes.  Denies any nausea or vomiting.  Otherwise no other complaints.    VITALS         Temp  Av.2 °F (36.8 °C)  Min: 97.6 °F (36.4 °C)  Max: 98.6 °F (37 °C)  Pulse  Av.8  Min: 68  Max: 113  Systolic (24hrs), Av , Min:100 , Max:139    Diastolic (24hrs), Av, Min:54, Max:82    Resp  Av.9  Min: 16  Max: 20  SpO2  Av.9 %  Min: 92 %  Max: 99 %  LABS      Recent Labs     24  0748   WBC 7.7   RBC 3.85   HGB 11.1*   HCT 34.1*   MCV 88.6   RDW 13.1        Recent Labs     24  0748      K 4.0      CO2 26   BUN 21*   GLUCOSE 125*   PT/INR:  Recent Labs     24  1113   PROTIME 13.2   INR 1.0     ESR: --   No results found for: \"CRP\", \"CRPM\"   Results       ** No results found for the last 336 hours. **           Physical Exam   AAOx3  Non-labored Respirations  Abdomen Non-tender  Musculoskeletal: Examination of the right leg:  Splint along the lower extremity is Clean, dry and intact. Mild swelling noted. Calves are soft, supple, non-tender upon palpation. unable to test pulses in right lower extremity due to splint. Brisk cap refill, foot warm well perfused, sensations intact proximal to ankle and feet, able to flex and extend the knee without difficulty, numbness noted to the toes, unable to wiggle toes     IMAGING       ASSESSMENT/PLAN         78-year-old female POD #1 S/P right ankle ORIF.  Patient doing well at this time.  She reports no pain to the right ankle however nerve block likely has not worn off.  Start PT/OT, NWB on RLE.       Non-weight bearing on right lower extremity in posterior splint.  Keep 
Orthopedic Progress Note    Date:7/10/2024       Room:[facility]  Patient Name:Alisson Kuo     YOB: 1945     Age:78 y.o.  female     SUBJECTIVE    2024: POD #1 s/p right ankle ORIF.  Patient reports she is doing well since surgery.  She reports no pain at this time.  However she reports numbness to the toes and inability to wiggle the toes.  Denies any nausea or vomiting.  Otherwise no other complaints.    07/10/2024: POD#2 s/p right ankle ORIF. Patient reports mild pain to the right ankle overnight controlled with pain medications.  She reports she is now able to wiggle her toes and does not have any numbness to the toes after the block wore off in the late afternoon hours yesterday.  She denies any nausea or vomiting.  Tolerating p.o. diet well.  PT/OT recommended SNF however patient declined as she would rather home health and family assist.  She states she has a bedroom on the first floor in a bathroom on the first floor and her son and grandson are very strong and able to help her into the house.  Otherwise no other complaints    VITALS         Temp  Av.1 °F (36.7 °C)  Min: 97.9 °F (36.6 °C)  Max: 98.2 °F (36.8 °C)  Pulse  Av.3  Min: 73  Max: 97  Systolic (24hrs), Av , Min:110 , Max:135    Diastolic (24hrs), Av, Min:63, Max:83    Resp  Av.4  Min: 16  Max: 19  SpO2  Av.5 %  Min: 93 %  Max: 96 %  LABS      Recent Labs     24  0748 24  1519   WBC 7.7 9.9   RBC 3.85 3.54*   HGB 11.1* 10.3*   HCT 34.1* 31.9*   MCV 88.6 90.1   RDW 13.1 13.1    136*     Recent Labs     24  0748 24  1519    139   K 4.0 3.7    109*   CO2 26 24   BUN 21* 21*   GLUCOSE 125* 122*   PT/INR:  Recent Labs     24  1113   PROTIME 13.2   INR 1.0     ESR: --   No results found for: \"CRP\", \"CRPM\"   Results       ** No results found for the last 336 hours. **           Physical Exam   AAOx3  Non-labored Respirations  Abdomen 
Received patient from PACU via bed, alert talking no complaints of pain voiced, assisted over to the bed ice pack to her right leg able to lift her leg but not her toes. Vital obtained educated to the room  call and bed will cont. To follow the plan of care.  
%.    General: Patient is awake, alert, oriented with appropriate mood and affect  HEENT: Atraumatic, normocephalic, pupils equal equally reactive to light.  Mucous membranes moist  Heart: First and second heart sounds present  Lungs: Air entry adequate  Abdomen: Soft, nontender, nondistended.  Bowel sounds present with regular frequency  Genitals: Deferred  Skin:  Extremities: Right ankle  CNS: Cranial nerves II to XII intact with no focal neurological deficit.      Lines/Drains/Airways/Wounds:  [unfilled]    LABS AND IMAGING   CBC  [unfilled]    Last 3 Hemoglobin  Lab Results   Component Value Date/Time    HGB 10.3 07/09/2024 03:19 PM    HGB 11.1 07/08/2024 07:48 AM     Last 3 WBC/ANC  Lab Results   Component Value Date/Time    WBC 9.9 07/09/2024 03:19 PM    WBC 7.7 07/08/2024 07:48 AM     No components found for: \"GRNLOCTYABS\"  Last 3 Platelets  No results found for: \"PLATELET\"  Chemistry  [unfilled]  [unfilled]  No results found for: \"LDH\"  Coagulation Studies  Lab Results   Component Value Date/Time    INR 1.0 07/08/2024 11:13 AM     Liver Function Studies  Lab Results   Component Value Date/Time    ALT 12 07/09/2024 03:19 PM    AST 13 07/09/2024 03:19 PM    ALKPHOS 44 07/09/2024 03:19 PM    ALBUMIN 2.9 07/09/2024 03:19 PM       Recent Imaging        Relevant labs and imaging reviewed    ASSESSMENT AND PLAN     Acute right ankle fracture/ disclocation  -x-ray revealed acute lateral malleolus fracture.  Posterior lateral tibiotalar subluxation.  Repeat x-ray revealed interval casting and posterior dislocation of the talus.  She is s/p ORIF and doing well.  Patient can start PT OT, nonweightbearing on the right lower extremity     Hypertension  Continue home antihypertensives     Hypercholesterolemia   Continue statin     Osteopenia     Tremor    DVT prophylaxis      [facility]  Hospitalist, Internal Medicine  7/9/2024 at 6:07 PM  
07/08/2024  Value: 0.1         Ref range: 0.0 - 0.1 K/UL     Status: Final  Immature Granulocytes Absolute                Date: 07/08/2024  Value: 0.1 (H)     Ref range: 0.00 - 0.04 K/UL   Status: Final  Differential Type                             Date: 07/08/2024  Value: AUTOMATED   Ref range:                    Status: Final  Sodium                                        Date: 07/08/2024  Value: 137         Ref range: 136 - 145 mmol/L   Status: Final  Potassium                                     Date: 07/08/2024  Value: 4.0         Ref range: 3.5 - 5.1 mmol/L   Status: Final                Comment: Specimen hemolyzed, results may be affected  Chloride                                      Date: 07/08/2024  Value: 106         Ref range: 97 - 108 mmol/L    Status: Final  CO2                                           Date: 07/08/2024  Value: 26          Ref range: 21 - 32 mmol/L     Status: Final  Anion Gap                                     Date: 07/08/2024  Value: 5           Ref range: 5 - 15 mmol/L      Status: Final  Glucose                                       Date: 07/08/2024  Value: 125 (H)     Ref range: 65 - 100 mg/dL     Status: Final  BUN                                           Date: 07/08/2024  Value: 21 (H)      Ref range: 6 - 20 mg/dL       Status: Final  Creatinine                                    Date: 07/08/2024  Value: 1.12 (H)    Ref range: 0.55 - 1.02 mg/dL  Status: Final  BUN/Creatinine Ratio                          Date: 07/08/2024  Value: 19          Ref range: 12 - 20            Status: Final  Est, Glom Filt Rate                           Date: 07/08/2024  Value: 51 (L)      Ref range: >60 ml/min/1.73m2  Status: Final                Comment:    Pediatric calculator link: https://www.kidney.org/professionals/kdoqi/gfr_calculatorped    These results are not intended for use in patients <18 years of age.     eGFR results are calculated without a race factor using  the 2021 CKD-EPI

## 2024-07-25 ENCOUNTER — OFFICE VISIT (OUTPATIENT)
Age: 79
End: 2024-07-25

## 2024-07-25 VITALS
HEIGHT: 66 IN | DIASTOLIC BLOOD PRESSURE: 58 MMHG | HEART RATE: 84 BPM | OXYGEN SATURATION: 94 % | TEMPERATURE: 98 F | SYSTOLIC BLOOD PRESSURE: 102 MMHG | BODY MASS INDEX: 25.71 KG/M2 | WEIGHT: 160 LBS

## 2024-07-25 DIAGNOSIS — S82.891D CLOSED FRACTURE OF RIGHT ANKLE WITH ROUTINE HEALING, SUBSEQUENT ENCOUNTER: Primary | ICD-10-CM

## 2024-07-25 PROCEDURE — 99024 POSTOP FOLLOW-UP VISIT: CPT | Performed by: ORTHOPAEDIC SURGERY

## 2024-07-25 RX ORDER — AMOXICILLIN 250 MG
3 CAPSULE ORAL DAILY
COMMUNITY

## 2024-07-25 RX ORDER — PROPRANOLOL HCL 60 MG
60 CAPSULE, EXTENDED RELEASE 24HR ORAL DAILY
COMMUNITY

## 2024-07-25 RX ORDER — OXYCODONE HYDROCHLORIDE 5 MG/1
5 TABLET ORAL EVERY 6 HOURS PRN
Qty: 20 TABLET | Refills: 0 | Status: SHIPPED | OUTPATIENT
Start: 2024-07-25 | End: 2024-07-30

## 2024-07-25 RX ORDER — ACETAMINOPHEN 325 MG/1
650 TABLET ORAL EVERY 6 HOURS PRN
COMMUNITY

## 2024-07-25 NOTE — PROGRESS NOTES
Identified pt with two pt identifiers (name and ). Reviewed chart in preparation for visit and have obtained necessary documentation.    Alisson Kuo is a 78 y.o. female  Chief Complaint   Patient presents with    Post-Op Check     Reason for Visit: Post Op, 2 wk, RT Ankle SX, 24  Pain level: 1  Patient states she would like to have some medication to assist with constipation.          BP (!) 102/58 (Site: Right Upper Arm, Position: Sitting, Cuff Size: Medium Adult)   Pulse 84   Temp 98 °F (36.7 °C) (Oral)   Ht 1.676 m (5' 6\")   Wt 72.6 kg (160 lb)   SpO2 94%   BMI 25.82 kg/m²     1. Have you been to the ER, urgent care clinic since your last visit?  Hospitalized since your last visit?no    2. Have you seen or consulted any other health care providers outside of the [facility] since your last visit?  Include any pap smears or colon screening. no

## 2024-07-25 NOTE — PROGRESS NOTES
is here for a follow up visit after undergoing Ankle Open Reduction Internal Fixation On Right - Right and . - Right on 7/8/2024.    Pain has been appropriate since surgery, no major medical complications since surgery. Patient reports pain is controlled on oxycodone, tylenol.    The patient denies fevers, chills, nausea, vomiting, numbness/paresthesias.  The patient has been following the weight bearing precautions: NWB RLE.  The patient has started  physical therapy.     Current Outpatient Medications on File Prior to Visit   Medication Sig Dispense Refill    propranolol (INDERAL LA) 60 MG extended release capsule Take 1 capsule by mouth daily      senna-docusate (PERICOLACE) 8.6-50 MG per tablet Take 3 tablets by mouth daily      acetaminophen (TYLENOL) 325 MG tablet Take 2 tablets by mouth every 6 hours as needed for Pain      aspirin (ASPIRIN 81) 81 MG chewable tablet Take 1 tablet by mouth daily      ALPRAZolam (XANAX) 0.25 MG tablet Take by mouth.      Calcium Carbonate-Vitamin D 600-3.125 MG-MCG TABS Take by mouth      estradiol (ESTRACE) 1 MG tablet Take 1 tablet by mouth daily      fluocinonide (LIDEX) 0.05 % cream Apply topically 2 times daily      fluticasone (FLONASE) 50 MCG/ACT nasal spray 2 sprays by Nasal route daily      loratadine (CLARITIN) 10 MG tablet Take 1 tablet by mouth      potassium gluconate 550 mg tablet Take 99 mg by mouth daily      primidone (MYSOLINE) 50 MG tablet Take by mouth 3 times daily      propranolol (INDERAL) 10 MG tablet Take by mouth 3 times daily      triamterene-hydroCHLOROthiazide (MAXZIDE-25) 37.5-25 MG per tablet Take by mouth daily       No current facility-administered medications on file prior to visit.       ROS:  General: denies agitation, fevers/chills  Cardiac: denies major chest pain  Gastrointestinal: denies nausea/vomiting  Neurologic: Denies numbness/paresthesias  Skin: Denies erythema, warmth to touch, active drainage  Musculoskeletal:

## 2024-08-12 ENCOUNTER — TELEPHONE (OUTPATIENT)
Age: 79
End: 2024-08-12

## 2024-08-12 NOTE — TELEPHONE ENCOUNTER
Patient's son Edmundo came in stating the patient is requesting a refill of her Oxycodone and would like the refill to be sent to [facility]/pharmacy #[facility] - [place], [place] - [address] - P [phone] - F [phone]  [address] [address]  Phone: [phone]  Fax: [phone]     Patient is requesting a call back at [phone].

## 2024-08-13 DIAGNOSIS — S82.891D CLOSED FRACTURE OF RIGHT ANKLE WITH ROUTINE HEALING, SUBSEQUENT ENCOUNTER: Primary | ICD-10-CM

## 2024-08-13 RX ORDER — OXYCODONE HYDROCHLORIDE 5 MG/1
5 TABLET ORAL EVERY 8 HOURS PRN
Qty: 21 TABLET | Refills: 0 | Status: SHIPPED | OUTPATIENT
Start: 2024-08-13 | End: 2024-08-20

## 2024-08-14 ENCOUNTER — HOSPITAL ENCOUNTER (OUTPATIENT)
Facility: HOSPITAL | Age: 79
Setting detail: OBSERVATION
Discharge: HOME HEALTH | End: 2024-08-16
Attending: EMERGENCY MEDICINE | Admitting: INTERNAL MEDICINE
Payer: MEDICARE

## 2024-08-14 ENCOUNTER — APPOINTMENT (OUTPATIENT)
Facility: HOSPITAL | Age: 79
End: 2024-08-14
Payer: MEDICARE

## 2024-08-14 DIAGNOSIS — R59.1 LYMPHADENOPATHY: Primary | ICD-10-CM

## 2024-08-14 DIAGNOSIS — R10.9 RIGHT FLANK PAIN: ICD-10-CM

## 2024-08-14 DIAGNOSIS — C81.98 HODGKIN'S GRANULOMA OF LYMPH NODES OF MULTIPLE SITES (HCC): ICD-10-CM

## 2024-08-14 DIAGNOSIS — M79.662 PAIN OF LEFT CALF: ICD-10-CM

## 2024-08-14 LAB
ALBUMIN SERPL-MCNC: 3.3 G/DL (ref 3.5–5)
ALBUMIN/GLOB SERPL: 0.9 (ref 1.1–2.2)
ALP SERPL-CCNC: 65 U/L (ref 45–117)
ALT SERPL-CCNC: 15 U/L (ref 12–78)
ANION GAP SERPL CALC-SCNC: 5 MMOL/L (ref 5–15)
APPEARANCE UR: CLEAR
AST SERPL W P-5'-P-CCNC: 6 U/L (ref 15–37)
BACTERIA URNS QL MICRO: NEGATIVE /HPF
BASOPHILS # BLD: 0.1 K/UL (ref 0–0.1)
BASOPHILS NFR BLD: 1 % (ref 0–1)
BILIRUB SERPL-MCNC: 0.2 MG/DL (ref 0.2–1)
BILIRUB UR QL: NEGATIVE
BUN SERPL-MCNC: 21 MG/DL (ref 6–20)
BUN/CREAT SERPL: 20 (ref 12–20)
CA-I BLD-MCNC: 9.8 MG/DL (ref 8.5–10.1)
CHLORIDE SERPL-SCNC: 102 MMOL/L (ref 97–108)
CO2 SERPL-SCNC: 29 MMOL/L (ref 21–32)
COLOR UR: ABNORMAL
CREAT SERPL-MCNC: 1.07 MG/DL (ref 0.55–1.02)
DIFFERENTIAL METHOD BLD: ABNORMAL
EOSINOPHIL # BLD: 0.2 K/UL (ref 0–0.4)
EOSINOPHIL NFR BLD: 2 % (ref 0–7)
EPITH CASTS URNS QL MICRO: ABNORMAL /LPF
ERYTHROCYTE [DISTWIDTH] IN BLOOD BY AUTOMATED COUNT: 13.2 % (ref 11.5–14.5)
GLOBULIN SER CALC-MCNC: 3.8 G/DL (ref 2–4)
GLUCOSE SERPL-MCNC: 90 MG/DL (ref 65–100)
GLUCOSE UR STRIP.AUTO-MCNC: NEGATIVE MG/DL
HCT VFR BLD AUTO: 36 % (ref 35–47)
HGB BLD-MCNC: 11.8 G/DL (ref 11.5–16)
HGB UR QL STRIP: NEGATIVE
HYALINE CASTS URNS QL MICRO: ABNORMAL /LPF (ref 0–5)
IMM GRANULOCYTES # BLD AUTO: 0.1 K/UL (ref 0–0.04)
IMM GRANULOCYTES NFR BLD AUTO: 1 % (ref 0–0.5)
KETONES UR QL STRIP.AUTO: 5 MG/DL
LEUKOCYTE ESTERASE UR QL STRIP.AUTO: ABNORMAL
LIPASE SERPL-CCNC: 20 U/L (ref 13–75)
LYMPHOCYTES # BLD: 1.6 K/UL (ref 0.8–3.5)
LYMPHOCYTES NFR BLD: 16 % (ref 12–49)
MCH RBC QN AUTO: 28.7 PG (ref 26–34)
MCHC RBC AUTO-ENTMCNC: 32.8 G/DL (ref 30–36.5)
MCV RBC AUTO: 87.6 FL (ref 80–99)
MONOCYTES # BLD: 1 K/UL (ref 0–1)
MONOCYTES NFR BLD: 10 % (ref 5–13)
MUCOUS THREADS URNS QL MICRO: ABNORMAL /LPF
NEUTS SEG # BLD: 7.1 K/UL (ref 1.8–8)
NEUTS SEG NFR BLD: 70 % (ref 32–75)
NITRITE UR QL STRIP.AUTO: NEGATIVE
NRBC # BLD: 0 K/UL (ref 0–0.01)
NRBC BLD-RTO: 0 PER 100 WBC
PH UR STRIP: 6 (ref 5–8)
PLATELET # BLD AUTO: 261 K/UL (ref 150–400)
PMV BLD AUTO: 11.4 FL (ref 8.9–12.9)
POTASSIUM SERPL-SCNC: 3.5 MMOL/L (ref 3.5–5.1)
PROT SERPL-MCNC: 7.1 G/DL (ref 6.4–8.2)
PROT UR STRIP-MCNC: NEGATIVE MG/DL
RBC # BLD AUTO: 4.11 M/UL (ref 3.8–5.2)
RBC #/AREA URNS HPF: ABNORMAL /HPF (ref 0–5)
SODIUM SERPL-SCNC: 136 MMOL/L (ref 136–145)
SP GR UR REFRACTOMETRY: 1.02 (ref 1–1.03)
UROBILINOGEN UR QL STRIP.AUTO: 0.1 EU/DL (ref 0.1–1)
WBC # BLD AUTO: 9.9 K/UL (ref 3.6–11)
WBC URNS QL MICRO: ABNORMAL /HPF (ref 0–4)

## 2024-08-14 PROCEDURE — 96374 THER/PROPH/DIAG INJ IV PUSH: CPT

## 2024-08-14 PROCEDURE — 83615 LACTATE (LD) (LDH) ENZYME: CPT

## 2024-08-14 PROCEDURE — 99285 EMERGENCY DEPT VISIT HI MDM: CPT

## 2024-08-14 PROCEDURE — 6360000002 HC RX W HCPCS: Performed by: EMERGENCY MEDICINE

## 2024-08-14 PROCEDURE — 94761 N-INVAS EAR/PLS OXIMETRY MLT: CPT

## 2024-08-14 PROCEDURE — 81001 URINALYSIS AUTO W/SCOPE: CPT

## 2024-08-14 PROCEDURE — 80053 COMPREHEN METABOLIC PANEL: CPT

## 2024-08-14 PROCEDURE — 36415 COLL VENOUS BLD VENIPUNCTURE: CPT

## 2024-08-14 PROCEDURE — 83690 ASSAY OF LIPASE: CPT

## 2024-08-14 PROCEDURE — 6370000000 HC RX 637 (ALT 250 FOR IP): Performed by: EMERGENCY MEDICINE

## 2024-08-14 PROCEDURE — 74176 CT ABD & PELVIS W/O CONTRAST: CPT

## 2024-08-14 PROCEDURE — 85025 COMPLETE CBC W/AUTO DIFF WBC: CPT

## 2024-08-14 PROCEDURE — 86140 C-REACTIVE PROTEIN: CPT

## 2024-08-14 PROCEDURE — 71275 CT ANGIOGRAPHY CHEST: CPT

## 2024-08-14 PROCEDURE — 6360000004 HC RX CONTRAST MEDICATION: Performed by: EMERGENCY MEDICINE

## 2024-08-14 RX ORDER — KETOROLAC TROMETHAMINE 15 MG/ML
15 INJECTION, SOLUTION INTRAMUSCULAR; INTRAVENOUS ONCE
Status: COMPLETED | OUTPATIENT
Start: 2024-08-14 | End: 2024-08-14

## 2024-08-14 RX ORDER — LIDOCAINE 4 G/G
1 PATCH TOPICAL
Status: COMPLETED | OUTPATIENT
Start: 2024-08-14 | End: 2024-08-15

## 2024-08-14 RX ADMIN — KETOROLAC TROMETHAMINE 15 MG: 15 INJECTION, SOLUTION INTRAMUSCULAR; INTRAVENOUS at 23:02

## 2024-08-14 RX ADMIN — IOPAMIDOL 100 ML: 755 INJECTION, SOLUTION INTRAVENOUS at 22:27

## 2024-08-14 ASSESSMENT — PAIN SCALES - GENERAL: PAINLEVEL_OUTOF10: 6

## 2024-08-14 ASSESSMENT — PAIN DESCRIPTION - ORIENTATION: ORIENTATION: LOWER

## 2024-08-14 ASSESSMENT — LIFESTYLE VARIABLES
HOW MANY STANDARD DRINKS CONTAINING ALCOHOL DO YOU HAVE ON A TYPICAL DAY: PATIENT DOES NOT DRINK
HOW OFTEN DO YOU HAVE A DRINK CONTAINING ALCOHOL: NEVER

## 2024-08-14 ASSESSMENT — PAIN DESCRIPTION - LOCATION: LOCATION: BACK

## 2024-08-15 PROBLEM — R10.9 LEFT FLANK PAIN: Status: ACTIVE | Noted: 2024-08-15

## 2024-08-15 PROBLEM — R59.9 ADENOPATHY: Status: ACTIVE | Noted: 2024-08-15

## 2024-08-15 PROBLEM — R22.2 MASS OF THORACIC STRUCTURE: Status: ACTIVE | Noted: 2024-08-15

## 2024-08-15 PROBLEM — K57.92 DIVERTICULITIS: Status: ACTIVE | Noted: 2024-08-15

## 2024-08-15 LAB
CRP SERPL-MCNC: 6.19 MG/DL (ref 0–0.3)
ERYTHROCYTE [SEDIMENTATION RATE] IN BLOOD: 30 MM/HR (ref 0–30)
LDH SERPL L TO P-CCNC: 141 U/L (ref 81–246)

## 2024-08-15 PROCEDURE — 96375 TX/PRO/DX INJ NEW DRUG ADDON: CPT

## 2024-08-15 PROCEDURE — 6370000000 HC RX 637 (ALT 250 FOR IP): Performed by: INTERNAL MEDICINE

## 2024-08-15 PROCEDURE — 36415 COLL VENOUS BLD VENIPUNCTURE: CPT

## 2024-08-15 PROCEDURE — 87040 BLOOD CULTURE FOR BACTERIA: CPT

## 2024-08-15 PROCEDURE — 6360000002 HC RX W HCPCS: Performed by: INTERNAL MEDICINE

## 2024-08-15 PROCEDURE — 94761 N-INVAS EAR/PLS OXIMETRY MLT: CPT

## 2024-08-15 PROCEDURE — 96374 THER/PROPH/DIAG INJ IV PUSH: CPT

## 2024-08-15 PROCEDURE — G0378 HOSPITAL OBSERVATION PER HR: HCPCS

## 2024-08-15 PROCEDURE — 85652 RBC SED RATE AUTOMATED: CPT

## 2024-08-15 RX ORDER — ACETAMINOPHEN 325 MG/1
650 TABLET ORAL EVERY 6 HOURS PRN
Status: DISCONTINUED | OUTPATIENT
Start: 2024-08-15 | End: 2024-08-16 | Stop reason: HOSPADM

## 2024-08-15 RX ORDER — ACETAMINOPHEN 650 MG/1
650 SUPPOSITORY RECTAL EVERY 6 HOURS PRN
Status: DISCONTINUED | OUTPATIENT
Start: 2024-08-15 | End: 2024-08-16 | Stop reason: HOSPADM

## 2024-08-15 RX ORDER — OXYCODONE HYDROCHLORIDE 5 MG/1
5 TABLET ORAL EVERY 4 HOURS PRN
Status: DISCONTINUED | OUTPATIENT
Start: 2024-08-15 | End: 2024-08-16 | Stop reason: HOSPADM

## 2024-08-15 RX ORDER — ONDANSETRON 2 MG/ML
4 INJECTION INTRAMUSCULAR; INTRAVENOUS EVERY 6 HOURS PRN
Status: DISCONTINUED | OUTPATIENT
Start: 2024-08-15 | End: 2024-08-16 | Stop reason: HOSPADM

## 2024-08-15 RX ADMIN — ACETAMINOPHEN 650 MG: 325 TABLET ORAL at 09:16

## 2024-08-15 RX ADMIN — OXYCODONE 5 MG: 5 TABLET ORAL at 14:34

## 2024-08-15 RX ADMIN — OXYCODONE 5 MG: 5 TABLET ORAL at 18:58

## 2024-08-15 RX ADMIN — OXYCODONE 5 MG: 5 TABLET ORAL at 23:08

## 2024-08-15 RX ADMIN — ONDANSETRON 4 MG: 2 INJECTION INTRAMUSCULAR; INTRAVENOUS at 13:14

## 2024-08-15 ASSESSMENT — PAIN DESCRIPTION - ORIENTATION
ORIENTATION: POSTERIOR
ORIENTATION: POSTERIOR

## 2024-08-15 ASSESSMENT — PAIN DESCRIPTION - DESCRIPTORS
DESCRIPTORS: DISCOMFORT;SORE
DESCRIPTORS: ACHING
DESCRIPTORS: ACHING
DESCRIPTORS: ACHING;THROBBING

## 2024-08-15 ASSESSMENT — PAIN SCALES - GENERAL
PAINLEVEL_OUTOF10: 8
PAINLEVEL_OUTOF10: 2
PAINLEVEL_OUTOF10: 2
PAINLEVEL_OUTOF10: 4
PAINLEVEL_OUTOF10: 8
PAINLEVEL_OUTOF10: 0

## 2024-08-15 ASSESSMENT — PAIN DESCRIPTION - LOCATION
LOCATION: BACK

## 2024-08-15 ASSESSMENT — PAIN - FUNCTIONAL ASSESSMENT: PAIN_FUNCTIONAL_ASSESSMENT: ACTIVITIES ARE NOT PREVENTED

## 2024-08-15 ASSESSMENT — PAIN SCALES - WONG BAKER: WONGBAKER_NUMERICALRESPONSE: HURTS A LITTLE BIT

## 2024-08-15 NOTE — ED NOTES
Patient requesting to take tylenol PM that she brought from home at this time. MD Sree notified and is ok with patient taking home meds.

## 2024-08-15 NOTE — PROGRESS NOTES
Hospitalist Progress Note               Daily Progress Note: 8/15/2024      Hospital Day: 2     Chief complaint:   Chief Complaint   Patient presents with    Flank Pain        Subjective:   Hospital course to date:    Alisson Kuo is a 78 y.o. female past medical history of hypertension hypercholesterolemia presents for evaluation of right flank pain.  Patient has a history of kidney stones but this feels little different.  No falls or trauma although she did recently break her ankle about a month ago.  She is in a walking boot.  No dysuria or hematuria.  No nausea vomit or diarrhea     --------  Patient is seen today for follow-up.    Patient seen today with some acute distress.  Patient reports that she was experiencing significant pain of the lower back around her waist.  She reports that initially the pain started 2 days ago on the right side, and now her lidocaine patch has worn off and reports pain bilaterally of the lower back.  Patient reports that she suffered a compound fracture of the right lower extremity on July 8.  She reports that she has been struggling keeping her balance and she fell in the bathroom.  She reports that she does not remember if she hit her lower back but says that is a high possibility.  Patient reports that whenever she fell she starts to notice bruises and pain roughly 4 to 5 days post fall.  Patient denies any vision changes, headaches, fevers, shortness of breath, palpitations, chest pains.  Breath sounds auscultated no significant murmurs, bruits, or rubs.  Lung fields clear to auscultation.  Normal active bowel sounds.  No tenderness to palpation of all 4 abdominal quadrants.  Abdomen is mildly distended and soft to palpation.  No significant swelling to the bilateral lower extremities, +2 pulses radial and dorsal pedal bilateral.  Strength 4 out of 5 x 4 extremities.  Cranial nerves I through XII intact.  No hepatosplenomegaly palpated.  Patient reports that she has  primidone (MYSOLINE) 50 MG tablet Take by mouth 3 times daily    propranolol (INDERAL) 10 MG tablet Take by mouth 3 times daily    triamterene-hydroCHLOROthiazide (MAXZIDE-25) 37.5-25 MG per tablet Take by mouth daily       Review of Systems:   Pertinent items are noted in HPI.    Objective:   Physical Exam:     BP (!) 103/42   Pulse (!) 8   Temp 97.8 °F (36.6 °C) (Oral)   Resp 16   Ht 1.676 m (5' 6\")   Wt 78.9 kg (174 lb)   SpO2 96%   BMI 28.08 kg/m²    O2 Device: None (Room air)    Temp (24hrs), Av.1 °F (36.7 °C), Min:97.8 °F (36.6 °C), Max:98.3 °F (36.8 °C)    No intake/output data recorded.   No intake/output data recorded.    Mode Rate TV Press PEEP FiO2 PIP Min. Vent                              General:   Awake and alert   Lungs:   Clear to auscultation bilaterally.   Chest wall:  No tenderness or deformity.   Heart:  Regular rate and rhythm, S1, S2 normal, no murmur, click, rub or gallop.   Abdomen:   Soft, non-tender. Bowel sounds normal. No masses,  No organomegaly.   Extremities: Extremities normal, atraumatic, no cyanosis or edema.   Pulses: 2+ and symmetric all extremities.   Skin: Skin color, texture, turgor normal. No rashes or lesions   Neurologic: CNII-XII intact.  No gross focal deficits         Data Review:       Recent Days:  Recent Labs     24   WBC 9.9   HGB 11.8   HCT 36.0        Recent Labs     24      K 3.5      CO2 29   GLUCOSE 90   BUN 21*   CREATININE 1.07*   CALCIUM 9.8   BILITOT 0.2   AST 6*   ALT 15     No results for input(s): \"PHART\", \"ONJ9ZIL\", \"PO2ART\", \"KQM2CRT\", \"BEART\", \"BNG5CSYH\", \"HGBART\", \"IC3MSMGJO\", \"FIO2A\", \"M9DLBYMO\", \"OXYHEM\", \"CARBOXHGBART\", \"METHGBART\", \"V5QESEGPI\", \"PHCORART\", \"TEMP\" in the last 72 hours.    Invalid input(s): \"JGL8VFOWL\"    24 Hour Results:  Recent Results (from the past 24 hour(s))   Urinalysis with Microscopic    Collection Time: 24  7:53 PM   Result Value Ref Range    Color, UA  Ratio 20 12 - 20      Est, Glom Filt Rate 53 (L) >60 ml/min/1.73m2    Calcium 9.8 8.5 - 10.1 mg/dL    Total Bilirubin 0.2 0.2 - 1.0 mg/dL    AST 6 (L) 15 - 37 U/L    ALT 15 12 - 78 U/L    Alk Phosphatase 65 45 - 117 U/L    Total Protein 7.1 6.4 - 8.2 g/dL    Albumin 3.3 (L) 3.5 - 5.0 g/dL    Globulin 3.8 2.0 - 4.0 g/dL    Albumin/Globulin Ratio 0.9 (L) 1.1 - 2.2     Lipase    Collection Time: 08/14/24  7:57 PM   Result Value Ref Range    Lipase 20 13 - 75 U/L   Lactate Dehydrogenase    Collection Time: 08/14/24  7:57 PM   Result Value Ref Range     81 - 246 U/L   C-Reactive Protein    Collection Time: 08/14/24  7:57 PM   Result Value Ref Range    CRP 6.19 (H) 0.00 - 0.30 mg/dL   Sedimentation Rate    Collection Time: 08/15/24  1:07 AM   Result Value Ref Range    Sed Rate, Automated 30 0 - 30 mm/hr       CTA CHEST W WO CONTRAST   Final Result      1. No thoracic aortic aneurysm or dissection.      2. There is circumferential soft tissue thickening around the lower thoracic   aorta and extending beneath bilateral diaphragmatic crura. Given the presence of   bulky right axillary and subpectoral lymphadenopathy and mild mediastinal   adenopathy, the para-aortic and retrocrural soft tissue is suspected to   represent additional adenopathy such as with lymphoma. Differential   consideration is vasculitis such as Takayasu arteritis.      3. Trace right pleural effusion.         Electronically signed by JACKIE HEDRICK      CT ABDOMEN PELVIS WO CONTRAST Additional Contrast? None   Final Result      1. There is fluid around the distal thoracic aorta which demonstrates   intermediate density suspicious for hemorrhage. A CTA of the chest is   recommended for further assessment. Findings were discussed with Dr. Cannon   when at 10:10 p.m. on 8/14/2024      2. Acute diverticulitis involving the distal descending colon. No evidence of   perforation or abscess formation.      3. No evidence of renal or ureteral stones.

## 2024-08-15 NOTE — H&P
aorta at the level of the right main pulmonary artery measures approximately 30 mm diameter. Descending aorta at the same level is 26 mm diameter. The aortic arch distal to the left subclavian artery is 26 mm diameter. There is no thoracic aortic aneurysm. Again seen is low-attenuation soft tissue thickening around the distal thoracic aorta to the hiatus. The tissue measures 6.1 x 4.7 cm maximum axial dimensions on series 401, slice 77. The aorta is patent and the lumen is not altered through this segment. There is no evidence of active hemorrhage. ADDITIONAL CT FINDINGS: Chest wall: There is a bulky right axillary adenopathy with the largest lymph nodes measuring 2.4 cm and 1.9 cm short axis dimension. There are right subpectoral lymph nodes. MEDIASTINUM: There are mildly enlarged mediastinal lymph nodes. Right paratracheal node measures 9 mm diameter on series 401, slice 32. A prominent periaortic node measures 10 mm diameter on series 401, slice 48. FRANCISCO: No mass or lymphadenopathy. THORACIC AORTA: As above HEART: Normal in size. ESOPHAGUS: No wall thickening or dilatation. TRACHEA/BRONCHI: Patent. PLEURA: There is a trace right-sided pleural effusion. There is no pneumothorax. LUNGS: No nodule, mass, or airspace disease. UPPER ABDOMEN: Please see the separate report for the CT abdomen/pelvis from earlier today. The above described periaortic soft tissue continues to the upper abdomen and is seen beneath bilateral crura. The soft tissue underneath the right crura where from the aorta is 3.8 x 1.6 cm on series 401, slice 85. BONES: No aggressive bone lesion or fracture.     1. No thoracic aortic aneurysm or dissection. 2. There is circumferential soft tissue thickening around the lower thoracic aorta and extending beneath bilateral diaphragmatic crura. Given the presence of bulky right axillary and subpectoral lymphadenopathy and mild mediastinal adenopathy, the para-aortic and retrocrural soft tissue is suspected  to represent additional adenopathy such as with lymphoma. Differential consideration is vasculitis such as Takayasu arteritis. 3. Trace right pleural effusion. Electronically signed by JACKIE HEDRICK    CT ABDOMEN PELVIS WO CONTRAST Additional Contrast? None    Result Date: 8/14/2024  EXAM: CT ABDOMEN PELVIS WO CONTRAST INDICATION: eval for kidney stone COMPARISON: None IV CONTRAST: None. ORAL CONTRAST: None TECHNIQUE: Thin axial images were obtained through the abdomen and pelvis. Coronal and sagittal reformats were generated. CT dose reduction was achieved through use of a standardized protocol tailored for this examination and automatic exposure control for dose modulation. The absence of intravenous contrast material reduces the sensitivity for evaluation of the vasculature and solid organs. FINDINGS: LOWER THORAX: There is slightly hyperdense fluid surrounding the the distal thoracic aorta. Liver: Unremarkable BILIARY TREE: Cholecystectomy. CBD is not dilated. SPLEEN: within normal limits. PANCREAS: No focal abnormality. ADRENALS: Unremarkable. KIDNEYS/URETERS: No calculus or hydronephrosis. Simple cyst left kidney STOMACH: Unremarkable. SMALL BOWEL: No dilatation or wall thickening. COLON: Colonic diverticulosis. There is mild diverticulitis involving the distal descending colon. APPENDIX: Unremarkable PERITONEUM: No ascites or pneumoperitoneum. RETROPERITONEUM: Atherosclerotic disease REPRODUCTIVE ORGANS: Unremarkable URINARY BLADDER: No mass or calculus. BONES: No destructive bone lesion. ABDOMINAL WALL: No mass or hernia. ADDITIONAL COMMENTS: N/A     1. There is fluid around the distal thoracic aorta which demonstrates intermediate density suspicious for hemorrhage. A CTA of the chest is recommended for further assessment. Findings were discussed with Dr. Cannon when at 10:10 p.m. on 8/14/2024 2. Acute diverticulitis involving the distal descending colon. No evidence of perforation or abscess formation. 3.  No evidence of renal or ureteral stones. Electronically signed by Hiram RENEE MD    Thank you Rl Leija MD for the opportunity to be involved in this patient's care.     Comment: Please note this report has been produced using speech recognition software and may contain errors related to that system including errors in grammar, punctuation, and spelling, as well as words and phrases that may be inappropriate. If there are any questions or concerns, please feel free to contact the dictating provider for clarification.     Electronically signed by CLEMENTINA RENEE MD on 8/15/2024 at 1:51 AM

## 2024-08-15 NOTE — CONSULTS
[facility] SURGERY CONSULT          Chief Complaint: Back pain     History of Present Illness:    Ms. Alisson Kuo is a 78 y.o. year old * female presents to hospital with 1 day history of lower right flank pain. She has a past medical history of recurrent kidney stones, hypertension, tremor, and osteopenia. She has a history of kidney stones but states this is a different kind of feeling.  No recent loss of weight or appetite, no fever or chills.  She denies chest pain, SOB, diarrhea and headache.         Past Medical History:   Past Medical History:   Diagnosis Date    Hypercholesterolemia 8/21/2020    Hypertensive disorder 8/21/2020    Lactose intolerance     Menopause     Obesity     Osteopenia 8/21/2020    Tremor 8/21/2020       Past Surgical History:   Past Surgical History:   Procedure Laterality Date    ANKLE FRACTURE SURGERY Right 7/8/2024    ANKLE OPEN REDUCTION INTERNAL FIXATION ON RIGHT performed by Kirsten Chen MD at [facility] MAIN OR    ANKLE SURGERY Right 7/8/2024    . performed by Kirsten Chen MD at [facility] MAIN OR    CHOLECYSTECTOMY      COLONOSCOPY      CYST INCISION AND DRAINAGE      GI  2016    Colonoscopy    HYSTERECTOMY (CERVIX STATUS UNKNOWN)          Allergy:  Allergies   Allergen Reactions    Codeine Rash    Propoxyphene Rash    Adhesive Tape      Other Reaction(s): RASH, BLISTERS    Penicillins Rash     Patient states       Social History:  reports that she quit smoking about 27 years ago. Her smoking use included cigarettes. She has never used smokeless tobacco. She reports that she does not drink alcohol and does not use drugs.     Family History:  Family History   Problem Relation Age of Onset    Diabetes Sister     Hypertension Mother     Heart Disease Mother         Current Medications:  Current Facility-Administered Medications:     acetaminophen (TYLENOL) tablet 650 mg, 650 mg, Oral, Q6H PRN, 650 mg at 08/15/24 0916 **OR** acetaminophen (TYLENOL) suppository 650 mg, 650  abscess formation.      3. No evidence of renal or ureteral stones.            Electronically signed by Hiram Horne        Assessment:  Problem List Items Addressed This Visit    None  Visit Diagnoses       Lymphadenopathy    -  Primary    Right flank pain               Consulted for excisional biopsy of masses found on CT.    Right lower flank pain, can be due to suspected masses.     Follow oncology recommendations.   Biopsy axillary and mediastinal masses.   Obtain PET scan after biopsy.      Plan:    Admission  Diet: N.p.o. after midnight   IV fluids  SCD  IS  Pain medications  Antibiotics  Nausea medication  Labs: Repeat in the AM  Consult: IR nuclear medicine  Procedure/Surgery: Excisional Biopsy of axilla lymph node  12. Risk, benefits and complications including bleeding, infection, dvt, pe, mi, stroke, sepsis, injury to nerve,, dehiscence, recurrence, discussed, questions answered, patient & family clearly understands and agrees with plan. All their questions were answered to their satisfaction. RN was present during entire conversation.     Thank you for the consultation & allowing me to participate in the care of this patient.

## 2024-08-15 NOTE — ED NOTES
ED TO INPATIENT SBAR HANDOFF    Patient Name: Alisson Kuo   :  1945  78 y.o.   MRN:  [id number]  Preferred Name  Alisson  ED Room #:  ER12/12  Family/Caregiver Present no   Restraints no   Sitter no   Sepsis Risk Score      Situation  Code Status: Full Code No additional code details.    Allergies: Codeine, Propoxyphene, Adhesive tape, and Penicillins  Weight: Patient Vitals for the past 96 hrs (Last 3 readings):   Weight   24 1915 78.9 kg (174 lb)     Arrived from: home  Chief Complaint:   Chief Complaint   Patient presents with    Flank Pain     Hospital Problem/Diagnosis:  Principal Problem:    Mass of thoracic structure  Active Problems:    Acute left flank pain    Diverticulitis    Adenopathy  Resolved Problems:    * No resolved hospital problems. *    Imaging:   CTA CHEST W WO CONTRAST   Final Result      1. No thoracic aortic aneurysm or dissection.      2. There is circumferential soft tissue thickening around the lower thoracic   aorta and extending beneath bilateral diaphragmatic crura. Given the presence of   bulky right axillary and subpectoral lymphadenopathy and mild mediastinal   adenopathy, the para-aortic and retrocrural soft tissue is suspected to   represent additional adenopathy such as with lymphoma. Differential   consideration is vasculitis such as Takayasu arteritis.      3. Trace right pleural effusion.         Electronically signed by JACKIE HEDRICK      CT ABDOMEN PELVIS WO CONTRAST Additional Contrast? None   Final Result      1. There is fluid around the distal thoracic aorta which demonstrates   intermediate density suspicious for hemorrhage. A CTA of the chest is   recommended for further assessment. Findings were discussed with Dr. Cannon   when at 10:10 p.m. on 2024      2. Acute diverticulitis involving the distal descending colon. No evidence of   perforation or abscess formation.      3. No evidence of renal or ureteral stones.            Electronically  Activities are not prevented  Last documented pain score (0-10 scale) Pain Level: 8  Last documented pain medication administered: 0925  Mental Status: oriented  Orientation Level:    NIH Score:    C-SSRS: Risk of Suicide: No Risk  Bedside swallow:    [place] Coma Scale (GCS): [place] Coma Scale  Eye Opening: Spontaneous  Best Verbal Response: Oriented  Best Motor Response: Obeys commands  Milagros Coma Scale Score: 15  Active LDA's:   Peripheral IV 08/14/24 Right Antecubital (Active)                 PO Status: Regular  Pertinent or High Risk Medications/Drips: no   If Yes, please provide details: na  Pending Blood Product Administration: no       You may also review the ED PT Care Timeline found under the Summary Nursing Index tab.    Recommendation    Pending orders admit orders, OT/PT consult, IR consult, oncology consult for lymphoma.   Plan for Discharge (if known):   Additional Comments: alert and oriented x 4.     If any further questions, please [place] RN at [phone]    Electronically signed by: Electronically signed by Trinidad Mccord RN on 8/15/2024 at 9:24 AM

## 2024-08-15 NOTE — CONSULTS
Hematology Oncology Consultation    Reason for consult: Adenopathy    Admitting Diagnosis: Adenopathy [R59.9]  Lymphadenopathy [R59.1]  Right flank pain [R10.9]    Impression:     Diffuse adenopathy  -bulky axillary adenopathy; no B symptoms or other obvious cbc changes; no primary masses locoregionally but still suspicious for neoplasia  -consult general surgery for excisional biopsy, will need flow cytometry as this could very well be low grade lymphoma like follicular    2. Flank pain-potentially related to intrabdominal adenopathy    3. Tremor  4. Brain stimulator in place    Ok to release if medically stable after excisional biopsy, followup in office in ~1wk after biopsy for results  If eager to go home and stable, then can arrange return with surgery for the biopsy    Discussion: Alisson Kuo is a  78 y.o. year old seen in consultation at the request of Dr. Jade for possible malignancy  She presented for evaluation of progressive flank pain. This was expected to be a renal stone however she had diffuse adenopathy.  This adenopathy that she could palpate had been present for months, she had no rapid weight loss, fevers or chills and has a fairly normal cbc suggesting that this is not a high grade lymphoma.  Imaging without other masses to suggest primary site of disease    Imaging:    CT reviewed    Labs:    Recent Results (from the past 24 hour(s))   Urinalysis with Microscopic    Collection Time: 08/14/24  7:53 PM   Result Value Ref Range    Color, UA Yellow/Straw      Appearance Clear Clear      Specific Gravity, UA 1.025 1.003 - 1.030      pH, Urine 6.0 5.0 - 8.0      Protein, UA Negative Negative mg/dL    Glucose, Ur Negative Negative mg/dL    Ketones, Urine 5 (A) Negative mg/dL    Bilirubin, Urine Negative Negative      Blood, Urine Negative Negative      Urobilinogen, Urine 0.1 0.1 - 1.0 EU/dL    Nitrite, Urine Negative Negative      Leukocyte Esterase, Urine Trace (A) Negative      WBC, UA 0-4

## 2024-08-15 NOTE — PROGRESS NOTES
Received patient to Room 501 via wheelchair assisted to the bed, educated to the bed call bell and the room. Instructed not to get up without assistance, xiomara wick placed skin assessment completed patient denies pain or any discomfort at this time, will cont to monitor.

## 2024-08-15 NOTE — PROGRESS NOTES
Hospitalist Progress Note               Daily Progress Note: 8/15/2024      Hospital Day: 2     Chief complaint:   Chief Complaint   Patient presents with    Flank Pain        Subjective:   Hospital course to date:    Alisson Kuo is a 78 y.o. female past medical history of hypertension hypercholesterolemia presents for evaluation of right flank pain.  Patient has a history of kidney stones but this feels little different.  No falls or trauma although she did recently break her ankle about a month ago.  She is in a walking boot.  No dysuria or hematuria.  No nausea vomit or diarrhea     --------  Patient is seen today for follow-up.    Patient seen today with some acute distress.  Patient reports that she was experiencing significant pain of the lower back around her waist.  She reports that initially the pain started 2 days ago on the right side, and now her lidocaine patch has worn off and reports pain bilaterally of the lower back.  Patient reports that she suffered a compound fracture of the right lower extremity on July 8.  She reports that she has been struggling keeping her balance and she fell in the bathroom.  She reports that she does not remember if she hit her lower back but says that is a high possibility.  Patient reports that whenever she fell she starts to notice bruises and pain roughly 4 to 5 days post fall.  Patient denies any vision changes, headaches, fevers, shortness of breath, palpitations, chest pains.  Breath sounds auscultated no significant murmurs, bruits, or rubs.  Lung fields clear to auscultation.  Normal active bowel sounds.  No tenderness to palpation of all 4 abdominal quadrants.  Abdomen is mildly distended and soft to palpation.  No significant swelling to the bilateral lower extremities, +2 pulses radial and dorsal pedal bilateral.  Strength 4 out of 5 x 4 extremities.  Cranial nerves I through XII intact.  No hepatosplenomegaly palpated.  Patient reports that she has

## 2024-08-15 NOTE — PROGRESS NOTES
..4 Eyes Skin Assessment     NAME:  Alisson Kuo  YOB: 1945  MEDICAL RECORD NUMBER:  [id number]    The patient is being assessed for  Admission    I agree that at least one RN has performed a thorough Head to Toe Skin Assessment on the patient. ALL assessment sites listed below have been assessed.      Areas assessed by both nurses:  healed scar on right outer ankle from ankle surgery, redness to groin area     Head, Face, Ears, Shoulders, Back, Chest, Arms, Elbows, Hands, Sacrum. Buttock, Coccyx, Ischium, and Legs. Feet and Heels        Does the Patient have a Wound? No noted wound(s)       Rafael Prevention initiated by RN: No  Wound Care Orders initiated by RN: Yes    Pressure Injury (Stage 3,4, Unstageable, DTI, NWPT, and Complex wounds) if present, place Wound referral order by RN under : No    New Ostomies, if present place, Ostomy referral order under : No     Nurse 1 eSignature: Electronically signed by LORETTA WALSH LPN on 8/15/24 at 3:55 PM EDT    **SHARE this note so that the co-signing nurse can place an eSignature**    Nurse 2 eSignature: {Esignature:[id number]}

## 2024-08-15 NOTE — ED PROVIDER NOTES
[facility] EMERGENCY DEPT  EMERGENCY DEPARTMENT HISTORY AND PHYSICAL EXAM      Date: 2024  Patient Name: Alisson Kuo  MRN: [id number]  Birthdate 1945  Date of evaluation: 2024  Provider: Kayli Balbuena MD   Note Started: 9:36 PM EDT 24    HISTORY OF PRESENT ILLNESS     Chief Complaint   Patient presents with    Flank Pain       History Provided By: Patient    HPI: Alisson Kuo is a 78 y.o. female past medical history of hypertension hypercholesterolemia presents for evaluation of right flank pain.  Patient has a history of kidney stones but this feels little different.  No falls or trauma although she did recently break her ankle about a month ago.  She is in a walking boot.  No dysuria or hematuria.  No nausea vomit or diarrhea    PAST MEDICAL HISTORY   Past Medical History:  Past Medical History:   Diagnosis Date    Hypercholesterolemia 2020    Hypertensive disorder 2020    Lactose intolerance     Menopause     Obesity     Osteopenia 2020    Tremor 2020       Past Surgical History:  Past Surgical History:   Procedure Laterality Date    ANKLE FRACTURE SURGERY Right 2024    ANKLE OPEN REDUCTION INTERNAL FIXATION ON RIGHT performed by Kirsten Chen MD at [facility] MAIN OR    ANKLE SURGERY Right 2024    . performed by Kirsten Chen MD at [facility] MAIN OR    CHOLECYSTECTOMY      COLONOSCOPY      CYST INCISION AND DRAINAGE      GI  2016    Colonoscopy    HYSTERECTOMY (CERVIX STATUS UNKNOWN)         Family History:  Family History   Problem Relation Age of Onset    Diabetes Sister     Hypertension Mother     Heart Disease Mother        Social History:  Social History     Tobacco Use    Smoking status: Former     Current packs/day: 0.00     Types: Cigarettes     Quit date: 1997     Years since quittin.6    Smokeless tobacco: Never   Vaping Use    Vaping status: Never Used   Substance Use Topics    Alcohol use: Never    Drug use: Never  clinically picture is not consistent with that.  Will defer antibiotics for now but blood cultures are ordered.  Admitted to Dr. Rhodes.  [LW]      ED Course User Index  [LW] Kayli Balbuena MD       SEPSIS Reassessment: Sepsis reassessment not applicable    Clinical Management Tools:  Not Applicable    Patient was given the following medications:  Medications   lidocaine 4 % external patch 1 patch (1 patch TransDERmal Patch Applied 8/14/24 2302)   ketorolac (TORADOL) injection 15 mg (15 mg IntraVENous Given 8/14/24 2302)   iopamidol (ISOVUE-370) 76 % injection 100 mL (100 mLs IntraVENous Given 8/14/24 2227)       CONSULTS: See ED Course/MDM for further details.  None     Social Determinants affecting Diagnosis/Treatment: None    Smoking Cessation: Not Applicable    PROCEDURES   Unless otherwise noted above, none  Procedures      CRITICAL CARE TIME   Patient does not meet Critical Care Time, 0 minutes    ED IMPRESSION     1. Lymphadenopathy    2. Right flank pain          DISPOSITION/PLAN   DISPOSITION Decision To Admit 08/15/2024 12:02:35 AM  Condition at Disposition: Stable    Admit Note: Pt is being admitted by Dr. Rhodes. The results of their tests and reason(s) for their admission have been discussed with pt and/or available family. They convey agreement and understanding for the need to be admitted and for the admission diagnosis.     I am the Primary Clinician of Record. Kayli Balbuena MD (electronically signed)    (Please note that parts of this dictation were completed with voice recognition software. Quite often unanticipated grammatical, syntax, homophones, and other interpretive errors are inadvertently transcribed by the computer software. Please disregards these errors. Please excuse any errors that have escaped final proofreading.)        Kayli Balbuena MD  08/15/24 0008

## 2024-08-15 NOTE — CARE COORDINATION
08/15/24 1019   Service Assessment   Patient Orientation Alert and Oriented   Cognition Alert   History Provided By Patient   Primary Caregiver Self   Accompanied By/Relationship Pt alone during visit. CM spoke with son ( Edmundo ) via phone for name of HH agency - to recall CM.   Support Systems Children;Family Members   Patient's Healthcare Decision Maker is: Legal Next of Kin   PCP Verified by CM Yes  (Rl Leija - seen 8/12/24)   Last Visit to PCP Within last 3 months   Prior Functional Level Assistance with the following:;Dressing;Cooking;Housework;Shopping;Mobility  (Cane/walker)   Current Functional Level Assistance with the following:;Dressing;Cooking;Housework;Shopping;Mobility  (Cane/walker)   Can patient return to prior living arrangement Yes   Ability to make needs known: Fair   Family able to assist with home care needs: Yes  (Son assists.)   Would you like for me to discuss the discharge plan with any other family members/significant others, and if so, who? Yes  (Daughter Fay/son Edmundo.)   Financial Resources Medicare   Community Resources None   CM/SW Referral Other (see comment)  (None)   Social/Functional History   Lives With Son   Type of Home House   Home Layout One level;Two level   Home Access Stairs to enter without rails   Entrance Stairs - Number of Steps None   Bathroom Shower/Tub Walk-in shower   Bathroom Toilet Standard   Bathroom Equipment None   Bathroom Accessibility Accessible   Home Equipment Cane;Walker - Rolling  (Boot)   Receives Help From Family   ADL Assistance Needs assistance   Toileting Needs assistance   Homemaking Assistance Needs assistance   Homemaking Responsibilities Yes   Ambulation Assistance Needs assistance  (Cane/walker)   Transfer Assistance Needs assistance   Active  No   Occupation Retired   Discharge Planning   Type of Residence House   Living Arrangements Children;Family Members   Current Services Prior To Admission None   Potential Assistance Needed N/A

## 2024-08-16 ENCOUNTER — APPOINTMENT (OUTPATIENT)
Facility: HOSPITAL | Age: 79
End: 2024-08-16
Attending: INTERNAL MEDICINE
Payer: MEDICARE

## 2024-08-16 ENCOUNTER — ANESTHESIA EVENT (OUTPATIENT)
Facility: HOSPITAL | Age: 79
End: 2024-08-16
Payer: MEDICARE

## 2024-08-16 ENCOUNTER — ANESTHESIA (OUTPATIENT)
Facility: HOSPITAL | Age: 79
End: 2024-08-16
Payer: MEDICARE

## 2024-08-16 VITALS
HEIGHT: 66 IN | TEMPERATURE: 97.7 F | SYSTOLIC BLOOD PRESSURE: 136 MMHG | WEIGHT: 174 LBS | BODY MASS INDEX: 27.97 KG/M2 | DIASTOLIC BLOOD PRESSURE: 70 MMHG | OXYGEN SATURATION: 93 % | HEART RATE: 95 BPM | RESPIRATION RATE: 18 BRPM

## 2024-08-16 LAB
ANION GAP SERPL CALC-SCNC: 8 MMOL/L (ref 5–15)
BASOPHILS # BLD: 0.1 K/UL (ref 0–0.1)
BASOPHILS NFR BLD: 1 % (ref 0–1)
BUN SERPL-MCNC: 15 MG/DL (ref 6–20)
BUN/CREAT SERPL: 15 (ref 12–20)
CA-I BLD-MCNC: 9.4 MG/DL (ref 8.5–10.1)
CHLORIDE SERPL-SCNC: 102 MMOL/L (ref 97–108)
CO2 SERPL-SCNC: 26 MMOL/L (ref 21–32)
CREAT SERPL-MCNC: 0.98 MG/DL (ref 0.55–1.02)
DIFFERENTIAL METHOD BLD: ABNORMAL
ECHO BSA: 1.92 M2
EOSINOPHIL # BLD: 0.2 K/UL (ref 0–0.4)
EOSINOPHIL NFR BLD: 2 % (ref 0–7)
ERYTHROCYTE [DISTWIDTH] IN BLOOD BY AUTOMATED COUNT: 13.1 % (ref 11.5–14.5)
GLUCOSE BLD STRIP.AUTO-MCNC: 104 MG/DL (ref 65–100)
GLUCOSE SERPL-MCNC: 81 MG/DL (ref 65–100)
HCT VFR BLD AUTO: 34.9 % (ref 35–47)
HGB BLD-MCNC: 11.2 G/DL (ref 11.5–16)
IMM GRANULOCYTES # BLD AUTO: 0.1 K/UL (ref 0–0.04)
IMM GRANULOCYTES NFR BLD AUTO: 1 % (ref 0–0.5)
LYMPHOCYTES # BLD: 2 K/UL (ref 0.8–3.5)
LYMPHOCYTES NFR BLD: 20 % (ref 12–49)
MCH RBC QN AUTO: 28.5 PG (ref 26–34)
MCHC RBC AUTO-ENTMCNC: 32.1 G/DL (ref 30–36.5)
MCV RBC AUTO: 88.8 FL (ref 80–99)
MONOCYTES # BLD: 1 K/UL (ref 0–1)
MONOCYTES NFR BLD: 10 % (ref 5–13)
NEUTS SEG # BLD: 6.6 K/UL (ref 1.8–8)
NEUTS SEG NFR BLD: 66 % (ref 32–75)
NRBC # BLD: 0 K/UL (ref 0–0.01)
NRBC BLD-RTO: 0 PER 100 WBC
PERFORMED BY:: ABNORMAL
PLATELET # BLD AUTO: 263 K/UL (ref 150–400)
PMV BLD AUTO: 11.7 FL (ref 8.9–12.9)
POTASSIUM SERPL-SCNC: 3.6 MMOL/L (ref 3.5–5.1)
RBC # BLD AUTO: 3.93 M/UL (ref 3.8–5.2)
SODIUM SERPL-SCNC: 136 MMOL/L (ref 136–145)
WBC # BLD AUTO: 9.9 K/UL (ref 3.6–11)

## 2024-08-16 PROCEDURE — 88365 INSITU HYBRIDIZATION (FISH): CPT

## 2024-08-16 PROCEDURE — 6360000002 HC RX W HCPCS: Performed by: NURSE ANESTHETIST, CERTIFIED REGISTERED

## 2024-08-16 PROCEDURE — 93971 EXTREMITY STUDY: CPT

## 2024-08-16 PROCEDURE — 2500000003 HC RX 250 WO HCPCS: Performed by: NURSE ANESTHETIST, CERTIFIED REGISTERED

## 2024-08-16 PROCEDURE — 97161 PT EVAL LOW COMPLEX 20 MIN: CPT

## 2024-08-16 PROCEDURE — 2580000003 HC RX 258: Performed by: NURSE ANESTHETIST, CERTIFIED REGISTERED

## 2024-08-16 PROCEDURE — 7100000001 HC PACU RECOVERY - ADDTL 15 MIN: Performed by: SURGERY

## 2024-08-16 PROCEDURE — 88342 IMHCHEM/IMCYTCHM 1ST ANTB: CPT

## 2024-08-16 PROCEDURE — 80048 BASIC METABOLIC PNL TOTAL CA: CPT

## 2024-08-16 PROCEDURE — 82962 GLUCOSE BLOOD TEST: CPT

## 2024-08-16 PROCEDURE — 6360000002 HC RX W HCPCS: Performed by: SURGERY

## 2024-08-16 PROCEDURE — 2709999900 HC NON-CHARGEABLE SUPPLY: Performed by: SURGERY

## 2024-08-16 PROCEDURE — 88305 TISSUE EXAM BY PATHOLOGIST: CPT

## 2024-08-16 PROCEDURE — 2580000003 HC RX 258: Performed by: ANESTHESIOLOGY

## 2024-08-16 PROCEDURE — 7100000000 HC PACU RECOVERY - FIRST 15 MIN: Performed by: SURGERY

## 2024-08-16 PROCEDURE — G0378 HOSPITAL OBSERVATION PER HR: HCPCS

## 2024-08-16 PROCEDURE — 3700000001 HC ADD 15 MINUTES (ANESTHESIA): Performed by: SURGERY

## 2024-08-16 PROCEDURE — 97530 THERAPEUTIC ACTIVITIES: CPT

## 2024-08-16 PROCEDURE — 88360 TUMOR IMMUNOHISTOCHEM/MANUAL: CPT

## 2024-08-16 PROCEDURE — 3600000002 HC SURGERY LEVEL 2 BASE: Performed by: SURGERY

## 2024-08-16 PROCEDURE — 3600000012 HC SURGERY LEVEL 2 ADDTL 15MIN: Performed by: SURGERY

## 2024-08-16 PROCEDURE — 36415 COLL VENOUS BLD VENIPUNCTURE: CPT

## 2024-08-16 PROCEDURE — 85025 COMPLETE CBC W/AUTO DIFF WBC: CPT

## 2024-08-16 PROCEDURE — 3700000000 HC ANESTHESIA ATTENDED CARE: Performed by: SURGERY

## 2024-08-16 PROCEDURE — 88341 IMHCHEM/IMCYTCHM EA ADD ANTB: CPT

## 2024-08-16 PROCEDURE — 6370000000 HC RX 637 (ALT 250 FOR IP): Performed by: INTERNAL MEDICINE

## 2024-08-16 RX ORDER — NALOXONE HYDROCHLORIDE 0.4 MG/ML
INJECTION, SOLUTION INTRAMUSCULAR; INTRAVENOUS; SUBCUTANEOUS PRN
Status: DISCONTINUED | OUTPATIENT
Start: 2024-08-16 | End: 2024-08-16 | Stop reason: HOSPADM

## 2024-08-16 RX ORDER — LABETALOL HYDROCHLORIDE 5 MG/ML
10 INJECTION, SOLUTION INTRAVENOUS
Status: DISCONTINUED | OUTPATIENT
Start: 2024-08-16 | End: 2024-08-16 | Stop reason: HOSPADM

## 2024-08-16 RX ORDER — MAGNESIUM SULFATE IN WATER 40 MG/ML
2000 INJECTION, SOLUTION INTRAVENOUS PRN
Status: DISCONTINUED | OUTPATIENT
Start: 2024-08-16 | End: 2024-08-16 | Stop reason: HOSPADM

## 2024-08-16 RX ORDER — POTASSIUM CHLORIDE 20 MEQ/1
40 TABLET, EXTENDED RELEASE ORAL PRN
Status: DISCONTINUED | OUTPATIENT
Start: 2024-08-16 | End: 2024-08-16 | Stop reason: HOSPADM

## 2024-08-16 RX ORDER — ONDANSETRON 2 MG/ML
4 INJECTION INTRAMUSCULAR; INTRAVENOUS
Status: DISCONTINUED | OUTPATIENT
Start: 2024-08-16 | End: 2024-08-16 | Stop reason: HOSPADM

## 2024-08-16 RX ORDER — OXYCODONE HYDROCHLORIDE 5 MG/1
10 TABLET ORAL PRN
Status: DISCONTINUED | OUTPATIENT
Start: 2024-08-16 | End: 2024-08-16 | Stop reason: HOSPADM

## 2024-08-16 RX ORDER — SODIUM CHLORIDE, SODIUM LACTATE, POTASSIUM CHLORIDE, CALCIUM CHLORIDE 600; 310; 30; 20 MG/100ML; MG/100ML; MG/100ML; MG/100ML
INJECTION, SOLUTION INTRAVENOUS ONCE
Status: COMPLETED | OUTPATIENT
Start: 2024-08-16 | End: 2024-08-16

## 2024-08-16 RX ORDER — GLUCAGON 1 MG/ML
1 KIT INJECTION PRN
Status: DISCONTINUED | OUTPATIENT
Start: 2024-08-16 | End: 2024-08-16 | Stop reason: HOSPADM

## 2024-08-16 RX ORDER — IPRATROPIUM BROMIDE AND ALBUTEROL SULFATE 2.5; .5 MG/3ML; MG/3ML
1 SOLUTION RESPIRATORY (INHALATION)
Status: DISCONTINUED | OUTPATIENT
Start: 2024-08-16 | End: 2024-08-16 | Stop reason: HOSPADM

## 2024-08-16 RX ORDER — PROPOFOL 10 MG/ML
INJECTION, EMULSION INTRAVENOUS PRN
Status: DISCONTINUED | OUTPATIENT
Start: 2024-08-16 | End: 2024-08-16 | Stop reason: SDUPTHER

## 2024-08-16 RX ORDER — DIPHENHYDRAMINE HYDROCHLORIDE 50 MG/ML
12.5 INJECTION INTRAMUSCULAR; INTRAVENOUS
Status: DISCONTINUED | OUTPATIENT
Start: 2024-08-16 | End: 2024-08-16 | Stop reason: HOSPADM

## 2024-08-16 RX ORDER — SODIUM CHLORIDE 0.9 % (FLUSH) 0.9 %
5-40 SYRINGE (ML) INJECTION PRN
Status: DISCONTINUED | OUTPATIENT
Start: 2024-08-16 | End: 2024-08-16 | Stop reason: HOSPADM

## 2024-08-16 RX ORDER — DEXTROSE MONOHYDRATE 100 MG/ML
INJECTION, SOLUTION INTRAVENOUS CONTINUOUS PRN
Status: DISCONTINUED | OUTPATIENT
Start: 2024-08-16 | End: 2024-08-16 | Stop reason: HOSPADM

## 2024-08-16 RX ORDER — FENTANYL CITRATE 50 UG/ML
50 INJECTION, SOLUTION INTRAMUSCULAR; INTRAVENOUS EVERY 5 MIN PRN
Status: DISCONTINUED | OUTPATIENT
Start: 2024-08-16 | End: 2024-08-16 | Stop reason: HOSPADM

## 2024-08-16 RX ORDER — SODIUM CHLORIDE 9 MG/ML
INJECTION, SOLUTION INTRAVENOUS PRN
Status: DISCONTINUED | OUTPATIENT
Start: 2024-08-16 | End: 2024-08-16 | Stop reason: HOSPADM

## 2024-08-16 RX ORDER — SODIUM CHLORIDE, SODIUM LACTATE, POTASSIUM CHLORIDE, CALCIUM CHLORIDE 600; 310; 30; 20 MG/100ML; MG/100ML; MG/100ML; MG/100ML
INJECTION, SOLUTION INTRAVENOUS CONTINUOUS PRN
Status: DISCONTINUED | OUTPATIENT
Start: 2024-08-16 | End: 2024-08-16 | Stop reason: SDUPTHER

## 2024-08-16 RX ORDER — SODIUM CHLORIDE 0.9 % (FLUSH) 0.9 %
5-40 SYRINGE (ML) INJECTION EVERY 12 HOURS SCHEDULED
Status: DISCONTINUED | OUTPATIENT
Start: 2024-08-16 | End: 2024-08-16 | Stop reason: HOSPADM

## 2024-08-16 RX ORDER — ONDANSETRON 4 MG/1
4 TABLET, ORALLY DISINTEGRATING ORAL EVERY 8 HOURS PRN
Status: DISCONTINUED | OUTPATIENT
Start: 2024-08-16 | End: 2024-08-16 | Stop reason: HOSPADM

## 2024-08-16 RX ORDER — LIDOCAINE 4 G/G
1 PATCH TOPICAL AS NEEDED
Status: DISCONTINUED | OUTPATIENT
Start: 2024-08-16 | End: 2024-08-16 | Stop reason: HOSPADM

## 2024-08-16 RX ORDER — HYDRALAZINE HYDROCHLORIDE 20 MG/ML
10 INJECTION INTRAMUSCULAR; INTRAVENOUS
Status: DISCONTINUED | OUTPATIENT
Start: 2024-08-16 | End: 2024-08-16 | Stop reason: HOSPADM

## 2024-08-16 RX ORDER — CLINDAMYCIN PHOSPHATE 900 MG/50ML
900 INJECTION, SOLUTION INTRAVENOUS ONCE
Status: COMPLETED | OUTPATIENT
Start: 2024-08-16 | End: 2024-08-16

## 2024-08-16 RX ORDER — LIDOCAINE HYDROCHLORIDE 20 MG/ML
INJECTION, SOLUTION EPIDURAL; INFILTRATION; INTRACAUDAL; PERINEURAL PRN
Status: DISCONTINUED | OUTPATIENT
Start: 2024-08-16 | End: 2024-08-16 | Stop reason: SDUPTHER

## 2024-08-16 RX ORDER — LORAZEPAM 2 MG/ML
0.5 INJECTION INTRAMUSCULAR
Status: DISCONTINUED | OUTPATIENT
Start: 2024-08-16 | End: 2024-08-16 | Stop reason: HOSPADM

## 2024-08-16 RX ORDER — ONDANSETRON 2 MG/ML
4 INJECTION INTRAMUSCULAR; INTRAVENOUS EVERY 6 HOURS PRN
Status: DISCONTINUED | OUTPATIENT
Start: 2024-08-16 | End: 2024-08-16 | Stop reason: HOSPADM

## 2024-08-16 RX ORDER — BUPIVACAINE HYDROCHLORIDE 5 MG/ML
INJECTION, SOLUTION EPIDURAL; INTRACAUDAL PRN
Status: DISCONTINUED | OUTPATIENT
Start: 2024-08-16 | End: 2024-08-16 | Stop reason: ALTCHOICE

## 2024-08-16 RX ORDER — OXYCODONE HYDROCHLORIDE 5 MG/1
5 TABLET ORAL PRN
Status: DISCONTINUED | OUTPATIENT
Start: 2024-08-16 | End: 2024-08-16 | Stop reason: HOSPADM

## 2024-08-16 RX ORDER — POLYETHYLENE GLYCOL 3350 17 G/17G
17 POWDER, FOR SOLUTION ORAL DAILY PRN
Status: DISCONTINUED | OUTPATIENT
Start: 2024-08-16 | End: 2024-08-16 | Stop reason: HOSPADM

## 2024-08-16 RX ORDER — METOCLOPRAMIDE HYDROCHLORIDE 5 MG/ML
10 INJECTION INTRAMUSCULAR; INTRAVENOUS
Status: DISCONTINUED | OUTPATIENT
Start: 2024-08-16 | End: 2024-08-16 | Stop reason: HOSPADM

## 2024-08-16 RX ORDER — HYDROMORPHONE HYDROCHLORIDE 1 MG/ML
0.5 INJECTION, SOLUTION INTRAMUSCULAR; INTRAVENOUS; SUBCUTANEOUS EVERY 5 MIN PRN
Status: DISCONTINUED | OUTPATIENT
Start: 2024-08-16 | End: 2024-08-16 | Stop reason: HOSPADM

## 2024-08-16 RX ORDER — POTASSIUM CHLORIDE 7.45 MG/ML
10 INJECTION INTRAVENOUS PRN
Status: DISCONTINUED | OUTPATIENT
Start: 2024-08-16 | End: 2024-08-16 | Stop reason: HOSPADM

## 2024-08-16 RX ADMIN — CLINDAMYCIN PHOSPHATE 900 MG: 900 INJECTION, SOLUTION INTRAVENOUS at 13:42

## 2024-08-16 RX ADMIN — PHENYLEPHRINE HYDROCHLORIDE 100 MCG: 10 INJECTION INTRAVENOUS at 14:17

## 2024-08-16 RX ADMIN — LIDOCAINE HYDROCHLORIDE 60 MG: 20 SOLUTION INTRAVENOUS at 13:56

## 2024-08-16 RX ADMIN — PROPOFOL 50 MG: 10 INJECTION, EMULSION INTRAVENOUS at 14:09

## 2024-08-16 RX ADMIN — PHENYLEPHRINE HYDROCHLORIDE 100 MCG: 10 INJECTION INTRAVENOUS at 14:34

## 2024-08-16 RX ADMIN — PROPOFOL 100 MG: 10 INJECTION, EMULSION INTRAVENOUS at 13:56

## 2024-08-16 RX ADMIN — SODIUM CHLORIDE, POTASSIUM CHLORIDE, SODIUM LACTATE AND CALCIUM CHLORIDE: 600; 310; 30; 20 INJECTION, SOLUTION INTRAVENOUS at 16:04

## 2024-08-16 RX ADMIN — SODIUM CHLORIDE, POTASSIUM CHLORIDE, SODIUM LACTATE AND CALCIUM CHLORIDE: 600; 310; 30; 20 INJECTION, SOLUTION INTRAVENOUS at 13:46

## 2024-08-16 RX ADMIN — OXYCODONE 5 MG: 5 TABLET ORAL at 16:59

## 2024-08-16 RX ADMIN — PHENYLEPHRINE HYDROCHLORIDE 200 MCG: 10 INJECTION INTRAVENOUS at 14:03

## 2024-08-16 RX ADMIN — OXYCODONE 5 MG: 5 TABLET ORAL at 04:03

## 2024-08-16 ASSESSMENT — PAIN SCALES - GENERAL
PAINLEVEL_OUTOF10: 8
PAINLEVEL_OUTOF10: 4
PAINLEVEL_OUTOF10: 0
PAINLEVEL_OUTOF10: 0
PAINLEVEL_OUTOF10: 7

## 2024-08-16 ASSESSMENT — PAIN SCALES - WONG BAKER: WONGBAKER_NUMERICALRESPONSE: NO HURT

## 2024-08-16 ASSESSMENT — PAIN DESCRIPTION - LOCATION
LOCATION: BACK
LOCATION: ARM

## 2024-08-16 ASSESSMENT — PAIN DESCRIPTION - DESCRIPTORS
DESCRIPTORS: ACHING;DISCOMFORT;THROBBING
DESCRIPTORS: ACHING;CRUSHING

## 2024-08-16 ASSESSMENT — PAIN - FUNCTIONAL ASSESSMENT
PAIN_FUNCTIONAL_ASSESSMENT: PREVENTS OR INTERFERES SOME ACTIVE ACTIVITIES AND ADLS
PAIN_FUNCTIONAL_ASSESSMENT: FACE, LEGS, ACTIVITY, CRY, AND CONSOLABILITY (FLACC)

## 2024-08-16 ASSESSMENT — PAIN DESCRIPTION - ORIENTATION: ORIENTATION: RIGHT

## 2024-08-16 NOTE — CARE COORDINATION
0738: Chart reviewed.    Per notes; patient NPO for excisional biopsy of axilla lymph node followed via general surgery and oncology.    PT/OT evals pending discharge recs.    Per DCP assessment, patient is current with home health, but patient and son are not able to recall agency name.    Profile built in [facility].    CM will continue to follow patient and recs of medical team.

## 2024-08-16 NOTE — PLAN OF CARE
Problem: Safety - Adult  Goal: Free from fall injury  8/15/2024 2012 by Eileen Mccray, RN  Outcome: Progressing     Problem: Pain  Goal: Verbalizes/displays adequate comfort level or baseline comfort level  8/15/2024 2012 by Eileen Mccray, RN  Outcome: Progressing     Problem: Discharge Planning  Goal: Discharge to home or other facility with appropriate resources  8/15/2024 2012 by Eileen Mccray, RN  Outcome: Progressing

## 2024-08-16 NOTE — PROGRESS NOTES
Hematology Oncology Progress Note           Follow up for:Adenopathy  Chart notes reviewed since last visit.    Was able to see general surgery  Planned excisional bx today        Patient Vitals for the past 24 hrs:   BP Temp Temp src Pulse Resp SpO2   08/16/24 0727 114/66 98.1 °F (36.7 °C) -- 92 16 93 %   08/16/24 0708 122/70 98.4 °F (36.9 °C) -- 95 -- 95 %   08/16/24 0433 -- -- -- -- 17 --   08/16/24 0403 -- -- -- -- 16 --   08/16/24 0142 124/60 98.8 °F (37.1 °C) Oral 95 18 93 %   08/15/24 2338 -- -- -- -- 16 --   08/15/24 2308 -- -- -- -- 16 --   08/15/24 1928 -- -- -- -- 18 --   08/15/24 1923 131/65 98.1 °F (36.7 °C) Oral 89 18 96 %   08/15/24 1858 -- -- -- -- 20 --   08/15/24 1505 124/67 97.9 °F (36.6 °C) Oral 80 18 96 %   08/15/24 1009 111/61 97.9 °F (36.6 °C) Oral 91 20 94 %       Review of Systems:    Constitutional No fevers, chills, night sweats, excessive fatigue or weight loss.   Allergic/Immunologic No recent allergic reactions   Eyes No significant visual difficulties. No diplopia.   ENMT No problems with hearing, no sore throat, no sinus drainage.   Endocrine No hot flashes or night sweats. No cold intolerance, polyuria, or polydipsia   Hematologic/Lymphatic No easy bruising or bleeding.  The patient denies any tender or palpable lymph nodes   Breasts No abnormal masses of breast, nipple discharge or pain.   Respiratory No dyspnea on exertion, orthopnea, chest pain, cough or hemoptysis.   Cardiovascular No anginal chest pain, irregular heart beat, tachycardia, palpitations or orthopnea.   Gastrointestinal No nausea, vomiting, diarrhea, constipation, cramping, dysphagia, reflux, heartburn, GI bleeding, or early satiety.  No change in bowel habits.   Genitourinary (M) No hematuria, dysuria, increased frequency, urgency, hesitancy or incontinence.   Musculoskeletal No joint pain, swelling or redness. No decreased range of motion.   Integumentary No chronic rashes, inflammation, ulcerations, pruritus,  CO2 26 21 - 32 mmol/L    Anion Gap 8 5 - 15 mmol/L    Glucose 81 65 - 100 mg/dL    BUN 15 6 - 20 mg/dL    Creatinine 0.98 0.55 - 1.02 mg/dL    BUN/Creatinine Ratio 15 12 - 20      Est, Glom Filt Rate 59 (L) >60 ml/min/1.73m2    Calcium 9.4 8.5 - 10.1 mg/dL   CBC with Auto Differential    Collection Time: 08/16/24  6:35 AM   Result Value Ref Range    WBC 9.9 3.6 - 11.0 K/uL    RBC 3.93 3.80 - 5.20 M/uL    Hemoglobin 11.2 (L) 11.5 - 16.0 g/dL    Hematocrit 34.9 (L) 35.0 - 47.0 %    MCV 88.8 80.0 - 99.0 FL    MCH 28.5 26.0 - 34.0 PG    MCHC 32.1 30.0 - 36.5 g/dL    RDW 13.1 11.5 - 14.5 %    Platelets 263 150 - 400 K/uL    MPV 11.7 8.9 - 12.9 FL    Nucleated RBCs 0.0 0.0  WBC    nRBC 0.00 0.00 - 0.01 K/uL    Neutrophils % 66 32 - 75 %    Lymphocytes % 20 12 - 49 %    Monocytes % 10 5 - 13 %    Eosinophils % 2 0 - 7 %    Basophils % 1 0 - 1 %    Immature Granulocytes % 1 (H) 0 - 0.5 %    Neutrophils Absolute 6.6 1.8 - 8.0 K/UL    Lymphocytes Absolute 2.0 0.8 - 3.5 K/UL    Monocytes Absolute 1.0 0.0 - 1.0 K/UL    Eosinophils Absolute 0.2 0.0 - 0.4 K/UL    Basophils Absolute 0.1 0.0 - 0.1 K/UL    Immature Granulocytes Absolute 0.1 (H) 0.00 - 0.04 K/UL    Differential Type AUTOMATED         Imaging:        Assessment and Plan:   Diffuse adenopathy  -bulky axillary adenopathy; no B symptoms or other obvious cbc changes; no primary masses locoregionally but still suspicious for neoplasia  -consult general surgery for excisional biopsy, will need flow cytometry as this could very well be low grade lymphoma like follicular     2. Flank pain-potentially related to intrabdominal adenopathy     3. Tremor  4. Brain stimulator in place     Ok to release if medically stable after excisional biopsy, followup in office in ~1wk after biopsy for results  If eager to go home and stable, then can arrange return with surgery for the biopsy

## 2024-08-16 NOTE — PROGRESS NOTES
PHYSICAL THERAPY EVALUATION  Patient: Alisson Kuo (78 y.o. female)  Date: 8/16/2024  Primary Diagnosis: Adenopathy [R59.9]  Lymphadenopathy [R59.1]  Right flank pain [R10.9]  Procedure(s) (LRB):  RIGHT AXILLARY LYMPH NODE BIOPSY (Right) Day of Surgery   Precautions: Restrictions/Precautions  Restrictions/Precautions: Weight Bearing, Fall RiskLower Extremity Weight Bearing Restrictions  Right Lower Extremity Weight Bearing: Toe Touch Weight Bearing (TTWB with cam boot, per pt report)    Recommendations for nursing mobility: Out of bed to chair for meals, AD and gt belt for bed to chair , Amb to bathroom with AD and gait belt, and Assist x1    In place during session: External Catheter    ASSESSMENT  Pt is a 78 y.o. female admitted on 8/14/2024 for left flank pain; pt currently being treated for mass of thoracic structure, acute left flank pain, and diverticulitis. Pt semi-supine in bed upon PT arrival, agreeable to evaluation. Pt A&O x 4.     Based on the objective data described below, the patient currently presents with impaired functional mobility, decreased independence in ADLs, impaired strength, decreased activity tolerance, impaired balance, and impaired posture. (See below for objective details and assist levels).     Overall pt tolerated session fair today with c/o 3/10 constant left flank pain. Pt required SBA to CGA for bed mobility and transfers. Pt amb 45 feet with RW, gt belt and CGA; demonstrates NBOS with steady, step through gt pattern with increased WB on heel vs toe with generalized unsteadiness but no LOB or knee buckling noted. Noted SOB with mobility with tachycardia to 130-140s following mobility, nsg made aware. Pt will benefit from continued skilled PT to address above deficits and return to PLOF. Potential barriers for safe discharge: pt is a high fall risk. Current PT DC recommendation Home with Home Health Therapy and family assist once medically appropriate.     Start of Session End  functional    Range Of Motion:  AROM: Within functional limits  PROM: Within functional limits    Functional Mobility:  Bed Mobility:     Bed Mobility Training  Bed Mobility Training: Yes  Rolling: Stand-by assistance  Supine to Sit: Stand-by assistance  Sit to Supine: Stand-by assistance  Scooting: Stand-by assistance  Transfers:     Transfer Training  Transfer Training: Yes  Sit to Stand: Stand-by assistance  Stand to Sit: Stand-by assistance  Balance:    Balance  Sitting: Intact  Standing: Impaired  Standing - Static: Good;Constant support  Standing - Dynamic: Fair;Constant support    Ambulation/Gait Training:   Right Side Weight Bearing: Toe touch   Gait  Gait Training: Yes  Right Side Weight Bearing: Toe touch  Overall Level of Assistance: Contact-guard assistance  Distance (ft): 45 Feet  Assistive Device: Gait belt;Walker, rolling  Speed/Sheila: Slow  Stance: Right decreased  Gait Abnormalities: Decreased step clearance       Therapeutic Intervention provided:   OOB transfers, amb with AD, and standing dynamic balance    Functional Measure:  [facility] AM-PAC™ “6 Clicks”         Basic Mobility Inpatient Short Form  How much difficulty does the patient currently have... Unable A Lot A Little None   1.  Turning over in bed (including adjusting bedclothes, sheets and blankets)?   [] 1   [] 2   [] 3   [x] 4   2.  Sitting down on and standing up from a chair with arms ( e.g., wheelchair, bedside commode, etc.)   [] 1   [] 2   [] 3   [x] 4   3.  Moving from lying on back to sitting on the side of the bed?   [] 1   [] 2   [] 3   [x] 4          How much help from another person does the patient currently need... Total A Lot A Little None   4.  Moving to and from a bed to a chair (including a wheelchair)?   [] 1   [] 2   [x] 3   [] 4   5.  Need to walk in hospital room?   [] 1   [] 2   [x] 3   [] 4   6.  Climbing 3-5 steps with a railing?   [] 1   [x] 2   [] 3   [] 4   © 2007, Trustees of [facility],

## 2024-08-16 NOTE — OP NOTE
Operative Note      Patient: Alisson Kuo  YOB: 1945  MRN: [id number]    Date of Procedure: 8/16/2024    Pre-Op Diagnosis: Generalised lymphadenopathy/Lymphoma    Post-Op Diagnosis: Same       Procedure(s):  RIGHT AXILLARY LYMPH NODE BIOPSY    Surgeon(s):  Tavo Bangura MD    Assistant:  Surgical Assistant: Sheila Hendrix/Mr. Eaton    Anesthesia: General/local    Anesthesiologist: Dr. Murphy    CRNA: Mr. Jesus Bunch    Indication: Generalised lymphadenopathy suspicious for lymphoma.    Procedure:    Estimated Blood Loss (mL): Minimal    Complications: None    Specimens:   ID Type Source Tests Collected by Time Destination   1 : Right Axillary Lymph Node Tissue Axillary SURGICAL PATHOLOGY Tavo Bangura MD 8/16/2024 1429        Implants:  * No implants in log *      Drains: * No LDAs found *    Findings:  Infection Present At Time Of Surgery (PATOS) (choose all levels that have infection present):  No infection present  Other Findings: as above         Electronically signed by Tavo Bangura MD on 8/16/2024 at 2:45 PM

## 2024-08-16 NOTE — PROGRESS NOTES
Hospitalist Progress Note               Daily Progress Note: 8/16/2024      Hospital Day: 3     Chief complaint:   Chief Complaint   Patient presents with    Flank Pain        Subjective:   Hospital course to date:    Alisson Kuo is a 78 y.o. female past medical history of hypertension hypercholesterolemia presents for evaluation of right flank pain.  Patient has a history of kidney stones but this feels little different.  No falls or trauma although she did recently break her ankle about a month ago.  She is in a walking boot.  No dysuria or hematuria.  No nausea vomit or diarrhea.    Patient seen today with some acute distress.  Patient reports that she was experiencing significant pain of the lower back around her waist.  She reports that initially the pain started 2 days ago on the right side, and now her lidocaine patch has worn off and reports pain bilaterally of the lower back.  Patient reports that she suffered a compound fracture of the right lower extremity on July 8.  She reports that she has been struggling keeping her balance and she fell in the bathroom.  She reports that she does not remember if she hit her lower back but says that is a high possibility.  Patient reports that whenever she fell she starts to notice bruises and pain roughly 4 to 5 days post fall.    --------  Patient is seen today for follow-up.    Patient alert and oriented x 4.  Patient reports that she is feeling significantly better since last night.  Patient reports that she is still experiencing some discomfort in her lower back and states that surgery consulted her yesterday.  She is scheduled for an excisional biopsy today at 11 AM.  Patient also started to complain of some significant cramping in her left lower extremity.  There is no tenderness to palpation or swelling of the left lower extremity.  She reports that she is not on blood thinners however she takes an 81 mg aspirin every day, she has not taking her  Lymphocytes % 20 12 - 49 %    Monocytes % 10 5 - 13 %    Eosinophils % 2 0 - 7 %    Basophils % 1 0 - 1 %    Immature Granulocytes % 1 (H) 0 - 0.5 %    Neutrophils Absolute 6.6 1.8 - 8.0 K/UL    Lymphocytes Absolute 2.0 0.8 - 3.5 K/UL    Monocytes Absolute 1.0 0.0 - 1.0 K/UL    Eosinophils Absolute 0.2 0.0 - 0.4 K/UL    Basophils Absolute 0.1 0.0 - 0.1 K/UL    Immature Granulocytes Absolute 0.1 (H) 0.00 - 0.04 K/UL    Differential Type AUTOMATED         CTA CHEST W WO CONTRAST   Final Result      1. No thoracic aortic aneurysm or dissection.      2. There is circumferential soft tissue thickening around the lower thoracic   aorta and extending beneath bilateral diaphragmatic crura. Given the presence of   bulky right axillary and subpectoral lymphadenopathy and mild mediastinal   adenopathy, the para-aortic and retrocrural soft tissue is suspected to   represent additional adenopathy such as with lymphoma. Differential   consideration is vasculitis such as Takayasu arteritis.      3. Trace right pleural effusion.         Electronically signed by JACKIE HEDRICK      CT ABDOMEN PELVIS WO CONTRAST Additional Contrast? None   Final Result      1. There is fluid around the distal thoracic aorta which demonstrates   intermediate density suspicious for hemorrhage. A CTA of the chest is   recommended for further assessment. Findings were discussed with Dr. Cannon   when at 10:10 p.m. on 8/14/2024      2. Acute diverticulitis involving the distal descending colon. No evidence of   perforation or abscess formation.      3. No evidence of renal or ureteral stones.            Electronically signed by Hiram Horne             Discussion/MDM:     [x] High (any 2)    A. Problems (any 1)  [x] Acute/Chronic Illness/injury posing threat to life or bodily function:    [x] Severe exacerbation of chronic illness:    ---------------------------------------------------------------------  B. Risk of Treatment (any 1)   [] Drugs/treatments  that require intensive monitoring for toxicity include:    [] IV ABX requiring serial renal monitoring for nephrotoxicity:     [] IV Narcotic analgesia for adverse drug reaction  [] Aggressive IV diuresis requiring serial monitoring for renal impairment and electrolyte derangements  [] Critical electrolyte abnormalities requiring IV replacement and close serial monitoring  [] SQ Insulin SS- monitoring serial FSBS for Hypoglycemic adverse drug reaction  [] Other -   [] Change in code status:    [] Decision to escalate care:    [] Major surgery/procedure with associated risk factors:    ----------------------------------------------------------------------  C. Data (any 2)  [] Discussed current management and discharge planning options with Case Management.  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

## 2024-08-16 NOTE — PLAN OF CARE
Problem: Safety - Adult  Goal: Free from fall injury  8/16/2024 0736 by Belkys Crocker RN  Outcome: Progressing  8/15/2024 2012 by Eileen Mccray RN  Outcome: Progressing     Problem: Pain  Goal: Verbalizes/displays adequate comfort level or baseline comfort level  8/16/2024 0736 by Belkys Crocker RN  Outcome: Progressing  8/15/2024 2012 by Eileen Mccray RN  Outcome: Progressing     Problem: Discharge Planning  Goal: Discharge to home or other facility with appropriate resources  8/16/2024 0736 by Belkys Crocker RN  Outcome: Progressing  8/15/2024 2012 by Eileen Mccray RN  Outcome: Progressing     Problem: Safety - Adult  Goal: Free from fall injury  8/16/2024 0736 by Belkys Crocker RN  Outcome: Progressing  8/15/2024 2012 by Eileen Mcrcay RN  Outcome: Progressing     Problem: Pain  Goal: Verbalizes/displays adequate comfort level or baseline comfort level  8/16/2024 0736 by Belkys Crocker RN  Outcome: Progressing  8/15/2024 2012 by Eileen Mccray RN  Outcome: Progressing     Problem: Discharge Planning  Goal: Discharge to home or other facility with appropriate resources  8/16/2024 0736 by Belkys Crocker RN  Outcome: Progressing  8/15/2024 2012 by Eileen Mccray RN  Outcome: Progressing

## 2024-08-16 NOTE — PROGRESS NOTES
Discharge plan of care/case management plan validated with provider discharge order. Discharge order noted. Discharge instructions given to pt w pt verbalizing understanding. PIV x 2 removed with cath tip intact. Condition stable at time of discharge.

## 2024-08-16 NOTE — DISCHARGE SUMMARY
Physician Discharge Summary     Patient ID:    Alisson Kuo  [id number]  78 y.o.  1945    Admit date: 8/14/2024    Discharge date : 8/16/2024      Final Diagnoses:   Extensive retroperitoneal adenopathy  Possible lymphoma  Diverticulitis  Essential hypertension  Benign tremor      Reason for Hospitalization:    Alisson Kuo is a 78 y.o. female past medical history of hypertension hypercholesterolemia presents for evaluation of right flank pain.  Patient has a history of kidney stones but this feels little different.  No falls or trauma although she did recently break her ankle about a month ago.  She is in a walking boot.  No dysuria or hematuria.  No nausea vomit or diarrhea.     Patient seen today with some acute distress.  Patient reports that she was experiencing significant pain of the lower back around her waist.  She reports that initially the pain started 2 days ago on the right side, and now her lidocaine patch has worn off and reports pain bilaterally of the lower back.  Patient reports that she suffered a compound fracture of the right lower extremity on July 8.  She reports that she has been struggling keeping her balance and she fell in the bathroom.  She reports that she does not remember if she hit her lower back but says that is a high possibility.  Patient reports that whenever she fell she starts to notice bruises and pain roughly 4 to 5 days post fall.    Hospital Course:     Patient admitted to telemetry after visit to the ER    Patient was seen by oncology and surgery for assessment of possible mass found on CTA dedicated circumferential soft tissue thickening around the lower thoracic aorta and extended medial bilateral diaphragmatic crura given by the presence of bulky right axillary and subpectoral lymphadenopathy and mild mediastinal adenopathy, the para-aortic and retrocrural soft tissue is suspected to represent additional adenopathy such as with  lymphoma.    Patient developed cramping of the left lower extremity around the popliteal fossa area, DVT study ordered to rule out possible DVT of left lower extremity.    Patient will be discharged post excisional biopsy and DVT study        Discharge Medications:      Medication List        CHANGE how you take these medications      propranolol 60 MG extended release capsule  Commonly known as: INDERAL LA  What changed: Another medication with the same name was removed. Continue taking this medication, and follow the directions you see here.            CONTINUE taking these medications      acetaminophen 325 MG tablet  Commonly known as: TYLENOL     ALPRAZolam 0.25 MG tablet  Commonly known as: XANAX     Aspirin 81 81 MG chewable tablet  Generic drug: aspirin     Calcium Carbonate-Vitamin D 600-3.125 MG-MCG Tabs     fluocinonide 0.05 % cream  Commonly known as: LIDEX     fluticasone 50 MCG/ACT nasal spray  Commonly known as: FLONASE     loratadine 10 MG tablet  Commonly known as: CLARITIN     oxyCODONE 5 MG immediate release tablet  Commonly known as: ROXICODONE  Take 1 tablet by mouth every 8 hours as needed for Pain for up to 7 days. Max Daily Amount: 15 mg     potassium gluconate 550 mg tablet     primidone 50 MG tablet  Commonly known as: MYSOLINE     senna-docusate 8.6-50 MG per tablet  Commonly known as: PERICOLACE     triamterene-hydroCHLOROthiazide 37.5-25 MG per tablet  Commonly known as: MAXZIDE-25            STOP taking these medications      estradiol 1 MG tablet  Commonly known as: ESTRACE                Follow up Care:    Follow-up Information       Follow up With Specialties Details Why Contact Info    Tavo Bangura MD General Surgery Follow up in 1 week(s)  [address]  [facility] [id number] [phone]              Continue follow-up care with Dr. Garg postsurgery and oncology to assess lymphadenopathy and adenopathy        Diet:  cardiac

## 2024-08-16 NOTE — PROGRESS NOTES
PT eval order received and acknowledged. PT eval attempted at 0816 however per chart review pt currently pending excisional biopsy for mass of thoracic structure today. Will continue to follow patient and attempt PT eval at a later time. Thank you.

## 2024-08-16 NOTE — ANESTHESIA PRE PROCEDURE
MD at [facility] MAIN OR   • CHOLECYSTECTOMY     • COLONOSCOPY     • CYST INCISION AND DRAINAGE     • GI  2016    Colonoscopy   • HYSTERECTOMY (CERVIX STATUS UNKNOWN)         Social History:    Social History     Tobacco Use   • Smoking status: Former     Current packs/day: 0.00     Types: Cigarettes     Quit date: 1997     Years since quittin.6   • Smokeless tobacco: Never   Substance Use Topics   • Alcohol use: Never                                Counseling given: Not Answered      Vital Signs (Current):   Vitals:    24 0403 24 0433 24 0708 24 0727   BP:   122/70 114/66   Pulse:   95 92   Resp:    Temp:   98.4 °F (36.9 °C) 98.1 °F (36.7 °C)   TempSrc:       SpO2:   95% 93%   Weight:       Height:                                                  BP Readings from Last 3 Encounters:   24 114/66   24 (!) 102/58   07/10/24 (!) 147/81       NPO Status:                                                                                 BMI:   Wt Readings from Last 3 Encounters:   24 78.9 kg (174 lb)   24 72.6 kg (160 lb)   24 72.6 kg (160 lb)     Body mass index is 28.08 kg/m².    CBC:   Lab Results   Component Value Date/Time    WBC 9.9 2024 06:35 AM    RBC 3.93 2024 06:35 AM    HGB 11.2 2024 06:35 AM    HCT 34.9 2024 06:35 AM    MCV 88.8 2024 06:35 AM    RDW 13.1 2024 06:35 AM     2024 06:35 AM       CMP:   Lab Results   Component Value Date/Time     2024 06:35 AM    K 3.6 2024 06:35 AM     2024 06:35 AM    CO2 26 2024 06:35 AM    BUN 15 2024 06:35 AM    CREATININE 0.98 2024 06:35 AM    LABGLOM 59 2024 06:35 AM    GLUCOSE 81 2024 06:35 AM    CALCIUM 9.4 2024 06:35 AM    BILITOT 0.2 2024 07:57 PM    ALKPHOS 65 2024 07:57 PM    AST 6 2024 07:57 PM    ALT 15 2024 07:57 PM       POC Tests: No results for input(s): \"POCGLU\",

## 2024-08-18 LAB
BACTERIA SPEC CULT: NORMAL
BACTERIA SPEC CULT: NORMAL
Lab: NORMAL
Lab: NORMAL

## 2024-08-19 ENCOUNTER — HOSPITAL ENCOUNTER (OUTPATIENT)
Facility: HOSPITAL | Age: 79
Discharge: HOME | End: 2024-08-22
Payer: MEDICARE

## 2024-08-19 DIAGNOSIS — S82.891D CLOSED FRACTURE OF RIGHT ANKLE WITH ROUTINE HEALING, SUBSEQUENT ENCOUNTER: ICD-10-CM

## 2024-08-19 PROCEDURE — 73610 X-RAY EXAM OF ANKLE: CPT

## 2024-08-20 NOTE — DISCHARGE SUMMARY
Physician Discharge Summary     Patient ID:    Alisson Kuo  [id number]  78 y.o.  1945    Admit date: 8/14/2024    Discharge date : 8/20/2024      Final Diagnoses:   Extensive retroperitoneal adenopathy  Possible lymphoma  Diverticulitis  Essential hypertension  Benign tremor      Reason for Hospitalization:    Alisson Kuo is a 78 y.o. female past medical history of hypertension hypercholesterolemia presents for evaluation of right flank pain.  Patient has a history of kidney stones but this feels little different.  No falls or trauma although she did recently break her ankle about a month ago.  She is in a walking boot.  No dysuria or hematuria.  No nausea vomit or diarrhea.     Patient seen today with some acute distress.  Patient reports that she was experiencing significant pain of the lower back around her waist.  She reports that initially the pain started 2 days ago on the right side, and now her lidocaine patch has worn off and reports pain bilaterally of the lower back.  Patient reports that she suffered a compound fracture of the right lower extremity on July 8.  She reports that she has been struggling keeping her balance and she fell in the bathroom.  She reports that she does not remember if she hit her lower back but says that is a high possibility.  Patient reports that whenever she fell she starts to notice bruises and pain roughly 4 to 5 days post fall.    Hospital Course:     Patient admitted to telemetry after visit to the ER    Patient was seen by oncology and surgery for assessment of possible mass found on CTA dedicated circumferential soft tissue thickening around the lower thoracic aorta and extended medial bilateral diaphragmatic crura given by the presence of bulky right axillary and subpectoral lymphadenopathy and mild mediastinal adenopathy, the para-aortic and retrocrural soft tissue is suspected to represent additional adenopathy such as with

## 2024-08-22 ENCOUNTER — OFFICE VISIT (OUTPATIENT)
Age: 79
End: 2024-08-22

## 2024-08-22 VITALS
WEIGHT: 174 LBS | RESPIRATION RATE: 16 BRPM | BODY MASS INDEX: 27.97 KG/M2 | OXYGEN SATURATION: 98 % | SYSTOLIC BLOOD PRESSURE: 97 MMHG | HEART RATE: 79 BPM | TEMPERATURE: 98.6 F | HEIGHT: 66 IN | DIASTOLIC BLOOD PRESSURE: 65 MMHG

## 2024-08-22 DIAGNOSIS — S82.891D CLOSED FRACTURE OF RIGHT ANKLE WITH ROUTINE HEALING, SUBSEQUENT ENCOUNTER: Primary | ICD-10-CM

## 2024-08-22 LAB
BACTERIA SPEC CULT: NORMAL
BACTERIA SPEC CULT: NORMAL
Lab: NORMAL
Lab: NORMAL

## 2024-08-22 PROCEDURE — 99024 POSTOP FOLLOW-UP VISIT: CPT | Performed by: ORTHOPAEDIC SURGERY

## 2024-08-22 NOTE — PROGRESS NOTES
Chief Complaint   Patient presents with    Post-Op Check     BP 97/65   Pulse 79   Temp 98.6 °F (37 °C)   Resp 16   Ht 1.676 m (5' 6\")   Wt 78.9 kg (174 lb)   SpO2 98%   BMI 28.08 kg/m²   1. Have you been to the ER, urgent care clinic since your last visit?  Hospitalized since your last visit?Yes    2. Have you seen or consulted any other health care providers outside of the [facility] since your last visit?  Include any pap smears or colon screening. No    
now,  new right ankle xrays on follow up      Electronically signed by Kirsten Chen MD on 8/22/2024 at 2:39 PM

## 2024-09-16 ENCOUNTER — HOSPITAL ENCOUNTER (OUTPATIENT)
Facility: HOSPITAL | Age: 79
Discharge: HOME | End: 2024-09-19
Attending: INTERNAL MEDICINE
Payer: MEDICARE

## 2024-09-16 DIAGNOSIS — C81.08 HODGKIN'S DISEASE WITH LYMPHOCYTIC PREDOMINANCE, MULTIPLE SITES (HCC): ICD-10-CM

## 2024-09-16 DIAGNOSIS — C81.70 HODGKIN'S PARAGRANULOMA (HCC): ICD-10-CM

## 2024-09-16 PROCEDURE — 74177 CT ABD & PELVIS W/CONTRAST: CPT

## 2024-09-16 PROCEDURE — 6360000004 HC RX CONTRAST MEDICATION: Performed by: INTERNAL MEDICINE

## 2024-09-16 RX ORDER — IOPAMIDOL 755 MG/ML
100 INJECTION, SOLUTION INTRAVASCULAR
Status: COMPLETED | OUTPATIENT
Start: 2024-09-16 | End: 2024-09-16

## 2024-09-16 RX ADMIN — IOPAMIDOL 100 ML: 755 INJECTION, SOLUTION INTRAVENOUS at 16:50

## 2024-09-28 ENCOUNTER — HOSPITAL ENCOUNTER (OUTPATIENT)
Facility: HOSPITAL | Age: 79
Discharge: HOME | End: 2024-10-01
Attending: INTERNAL MEDICINE
Payer: MEDICARE

## 2024-09-28 DIAGNOSIS — C81.08 HODGKIN'S DISEASE WITH LYMPHOCYTIC PREDOMINANCE, MULTIPLE SITES (HCC): ICD-10-CM

## 2024-09-28 DIAGNOSIS — C81.70 HODGKIN'S PARAGRANULOMA (HCC): ICD-10-CM

## 2024-09-28 DIAGNOSIS — C81.48 HODGKIN'S DISEASE WITH LYMPHOCYTIC PREDOMINANCE, MULTIPLE SITES (HCC): ICD-10-CM

## 2024-09-28 PROCEDURE — A9609 HC RX DIAGNOSTIC RADIOPHARMACEUTICAL: HCPCS | Performed by: INTERNAL MEDICINE

## 2024-09-28 PROCEDURE — 3430000000 HC RX DIAGNOSTIC RADIOPHARMACEUTICAL: Performed by: INTERNAL MEDICINE

## 2024-09-28 PROCEDURE — 78815 PET IMAGE W/CT SKULL-THIGH: CPT

## 2024-09-28 RX ORDER — FLUDEOXYGLUCOSE F-18 500 MCI/ML
10 INJECTION INTRAVENOUS
Status: COMPLETED | OUTPATIENT
Start: 2024-09-28 | End: 2024-09-28

## 2024-09-28 RX ADMIN — FLUDEOXYGLUCOSE F-18 11.64 MILLICURIE: 500 INJECTION INTRAVENOUS at 14:43

## 2024-10-04 ENCOUNTER — HOSPITAL ENCOUNTER (OUTPATIENT)
Facility: HOSPITAL | Age: 79
Discharge: HOME | End: 2024-10-07
Payer: MEDICARE

## 2024-10-04 ENCOUNTER — TELEPHONE (OUTPATIENT)
Age: 79
End: 2024-10-04

## 2024-10-04 DIAGNOSIS — S82.891D CLOSED FRACTURE OF RIGHT ANKLE WITH ROUTINE HEALING, SUBSEQUENT ENCOUNTER: ICD-10-CM

## 2024-10-04 PROCEDURE — 73610 X-RAY EXAM OF ANKLE: CPT

## 2024-10-04 NOTE — TELEPHONE ENCOUNTER
Placed outbound call patient and was able to confirm appt with Dr. Chen on 01/07/2024 at 1:40pm and reminder of new address. Patient stated she was aware and disconnected

## 2024-10-07 ENCOUNTER — OFFICE VISIT (OUTPATIENT)
Age: 79
End: 2024-10-07

## 2024-10-07 VITALS
HEART RATE: 81 BPM | SYSTOLIC BLOOD PRESSURE: 109 MMHG | HEIGHT: 66 IN | BODY MASS INDEX: 27.97 KG/M2 | RESPIRATION RATE: 16 BRPM | OXYGEN SATURATION: 94 % | DIASTOLIC BLOOD PRESSURE: 51 MMHG | TEMPERATURE: 97.4 F | WEIGHT: 174 LBS

## 2024-10-07 DIAGNOSIS — S82.891D CLOSED FRACTURE OF RIGHT ANKLE WITH ROUTINE HEALING, SUBSEQUENT ENCOUNTER: Primary | ICD-10-CM

## 2024-10-07 PROCEDURE — 99024 POSTOP FOLLOW-UP VISIT: CPT | Performed by: ORTHOPAEDIC SURGERY

## 2024-10-07 NOTE — PROGRESS NOTES
Chief Complaint   Patient presents with    Post-Op Check     BP (!) 109/51   Pulse 81   Temp 97.4 °F (36.3 °C)   Resp 16   Ht 1.676 m (5' 6\")   Wt 78.9 kg (174 lb)   SpO2 94%   BMI 28.08 kg/m²   1. Have you been to the ER, urgent care clinic since your last visit?  Hospitalized since your last visit?No    2. Have you seen or consulted any other health care providers outside of the [facility] since your last visit?  Include any pap smears or colon screening. No

## 2024-10-07 NOTE — PROGRESS NOTES
is here for a follow up visit after undergoing Ankle Open Reduction Internal Fixation On Right - Right and . - Right on 7/8/2024 .    Pain has been appropriate since surgery, no major medical complications since surgery. Patient reports pain is controlled .    The patient reports numbness over dorsal ankle.  The patient has been following the weight bearing precautions: WBAT RLE in CAM boot with walker.  The patient  Completed  physical therapy.      She was recently diagnosed with Hodgkin's Lymphoma and starts chemotherapy on Thursday.  Current Outpatient Medications on File Prior to Visit   Medication Sig Dispense Refill    propranolol (INDERAL LA) 60 MG extended release capsule Take 1 capsule by mouth daily      senna-docusate (PERICOLACE) 8.6-50 MG per tablet Take 3 tablets by mouth daily      acetaminophen (TYLENOL) 325 MG tablet Take 2 tablets by mouth every 6 hours as needed for Pain      aspirin (ASPIRIN 81) 81 MG chewable tablet Take 1 tablet by mouth daily      ALPRAZolam (XANAX) 0.25 MG tablet Take by mouth.      Calcium Carbonate-Vitamin D 600-3.125 MG-MCG TABS Take by mouth      fluocinonide (LIDEX) 0.05 % cream Apply topically 2 times daily      fluticasone (FLONASE) 50 MCG/ACT nasal spray 2 sprays by Nasal route daily      loratadine (CLARITIN) 10 MG tablet Take 1 tablet by mouth      primidone (MYSOLINE) 50 MG tablet Take by mouth 3 times daily      triamterene-hydroCHLOROthiazide (MAXZIDE-25) 37.5-25 MG per tablet Take by mouth daily      potassium gluconate 550 mg tablet Take 99 mg by mouth daily       No current facility-administered medications on file prior to visit.       ROS:  General: denies agitation, fevers/chills  Cardiac: denies major chest pain  Gastrointestinal: denies nausea/vomiting  Neurologic: Denies numbness/paresthesias  Skin: Denies erythema, warmth to touch, active drainage  Musculoskeletal: Endorses appropriate pain at surgical site      Physical

## 2024-12-06 ENCOUNTER — HOSPITAL ENCOUNTER (OUTPATIENT)
Facility: HOSPITAL | Age: 79
Discharge: HOME | End: 2024-12-09
Payer: MEDICARE

## 2024-12-06 DIAGNOSIS — S82.891D CLOSED FRACTURE OF RIGHT ANKLE WITH ROUTINE HEALING, SUBSEQUENT ENCOUNTER: ICD-10-CM

## 2024-12-06 PROCEDURE — 73610 X-RAY EXAM OF ANKLE: CPT

## 2024-12-09 ENCOUNTER — OFFICE VISIT (OUTPATIENT)
Age: 79
End: 2024-12-09
Payer: MEDICARE

## 2024-12-09 VITALS
OXYGEN SATURATION: 95 % | HEIGHT: 66 IN | HEART RATE: 88 BPM | BODY MASS INDEX: 28.08 KG/M2 | SYSTOLIC BLOOD PRESSURE: 117 MMHG | DIASTOLIC BLOOD PRESSURE: 66 MMHG

## 2024-12-09 DIAGNOSIS — S82.891D CLOSED FRACTURE OF RIGHT ANKLE WITH ROUTINE HEALING, SUBSEQUENT ENCOUNTER: Primary | ICD-10-CM

## 2024-12-09 PROCEDURE — G8427 DOCREV CUR MEDS BY ELIG CLIN: HCPCS | Performed by: ORTHOPAEDIC SURGERY

## 2024-12-09 PROCEDURE — 3074F SYST BP LT 130 MM HG: CPT | Performed by: ORTHOPAEDIC SURGERY

## 2024-12-09 PROCEDURE — G8484 FLU IMMUNIZE NO ADMIN: HCPCS | Performed by: ORTHOPAEDIC SURGERY

## 2024-12-09 PROCEDURE — 1090F PRES/ABSN URINE INCON ASSESS: CPT | Performed by: ORTHOPAEDIC SURGERY

## 2024-12-09 PROCEDURE — G8400 PT W/DXA NO RESULTS DOC: HCPCS | Performed by: ORTHOPAEDIC SURGERY

## 2024-12-09 PROCEDURE — 1036F TOBACCO NON-USER: CPT | Performed by: ORTHOPAEDIC SURGERY

## 2024-12-09 PROCEDURE — 3078F DIAST BP <80 MM HG: CPT | Performed by: ORTHOPAEDIC SURGERY

## 2024-12-09 PROCEDURE — G8419 CALC BMI OUT NRM PARAM NOF/U: HCPCS | Performed by: ORTHOPAEDIC SURGERY

## 2024-12-09 PROCEDURE — 99213 OFFICE O/P EST LOW 20 MIN: CPT | Performed by: ORTHOPAEDIC SURGERY

## 2024-12-09 PROCEDURE — 1123F ACP DISCUSS/DSCN MKR DOCD: CPT | Performed by: ORTHOPAEDIC SURGERY

## 2024-12-09 PROCEDURE — 1160F RVW MEDS BY RX/DR IN RCRD: CPT | Performed by: ORTHOPAEDIC SURGERY

## 2024-12-09 PROCEDURE — 1159F MED LIST DOCD IN RCRD: CPT | Performed by: ORTHOPAEDIC SURGERY

## 2024-12-09 RX ORDER — IPRATROPIUM BROMIDE AND ALBUTEROL 20; 100 UG/1; UG/1
SPRAY, METERED RESPIRATORY (INHALATION)
COMMUNITY
Start: 2024-11-26

## 2024-12-09 NOTE — PROGRESS NOTES
Patient identified by name and date of birth  Alisson Kuo is a 79 y.o. female   Chief Complaint   Patient presents with    Follow-up     Closed fracture of right ankle with routine healing, subsequent encounter      Vitals:    12/09/24 1424   BP: 117/66   Site: Left Upper Arm   Pulse: 88   SpO2: 95%   Height: 1.676 m (5' 6\")       No LMP recorded. Patient has had a hysterectomy.           \"Have you been to the ER, urgent care clinic since your last visit?  Hospitalized since your last visit?\"    NO    “Have you seen or consulted any other [facility] care providers [facility] visit?”    NO

## 2024-12-09 NOTE — PROGRESS NOTES
is here for a follow up visit after undergoing Ankle Open Reduction Internal Fixation On Right - Right and . - Right on 7/8/2024 .    Pain has been appropriate since surgery, no major medical complications since surgery. Patient reports pain is controlled .    The patient reports numbness over dorsal ankle.  The patient has been following the weight bearing precautions: WBAT RLE in regular.  The patient  Completed  physical therapy.      She was recently diagnosed with Hodgkin's Lymphoma.  Current Outpatient Medications on File Prior to Visit   Medication Sig Dispense Refill    COMBIVENT RESPIMAT  MCG/ACT AERS inhaler INHALE 1 PUFF BY MOUTH EVERY 6 HOURS AS NEEDED      propranolol (INDERAL LA) 60 MG extended release capsule Take 1 capsule by mouth daily      senna-docusate (PERICOLACE) 8.6-50 MG per tablet Take 3 tablets by mouth daily      acetaminophen (TYLENOL) 325 MG tablet Take 2 tablets by mouth every 6 hours as needed for Pain      aspirin (ASPIRIN 81) 81 MG chewable tablet Take 1 tablet by mouth daily      ALPRAZolam (XANAX) 0.25 MG tablet Take by mouth.      Calcium Carbonate-Vitamin D 600-3.125 MG-MCG TABS Take by mouth      fluocinonide (LIDEX) 0.05 % cream Apply topically 2 times daily      fluticasone (FLONASE) 50 MCG/ACT nasal spray 2 sprays by Nasal route daily      loratadine (CLARITIN) 10 MG tablet Take 1 tablet by mouth      potassium gluconate 550 mg tablet Take 99 mg by mouth daily      primidone (MYSOLINE) 50 MG tablet Take by mouth 3 times daily      triamterene-hydroCHLOROthiazide (MAXZIDE-25) 37.5-25 MG per tablet Take by mouth daily       No current facility-administered medications on file prior to visit.       ROS:  General: denies agitation, fevers/chills  Cardiac: denies major chest pain  Gastrointestinal: denies nausea/vomiting  Neurologic: Denies numbness/paresthesias  Skin: Denies erythema, warmth to touch, active drainage  Musculoskeletal: Endorses appropriate

## 2025-01-15 ENCOUNTER — HOSPITAL ENCOUNTER (OUTPATIENT)
Facility: HOSPITAL | Age: 80
Discharge: HOME | End: 2025-01-18
Attending: INTERNAL MEDICINE
Payer: MEDICARE

## 2025-01-15 DIAGNOSIS — C81.70 HODGKIN'S PARAGRANULOMA (HCC): ICD-10-CM

## 2025-01-15 DIAGNOSIS — C81.08 NODULAR LYMPHOCYTE PREDOMINANT HODGKIN LYMPHOMA OF LYMPH NODES OF MULTIPLE SITES (HCC): ICD-10-CM

## 2025-01-15 LAB — CREAT BLD-MCNC: 1 MG/DL (ref 0.6–1.3)

## 2025-01-15 PROCEDURE — 71260 CT THORAX DX C+: CPT

## 2025-01-15 PROCEDURE — 6360000004 HC RX CONTRAST MEDICATION: Performed by: INTERNAL MEDICINE

## 2025-01-15 PROCEDURE — 82565 ASSAY OF CREATININE: CPT

## 2025-01-15 RX ORDER — IOPAMIDOL 755 MG/ML
100 INJECTION, SOLUTION INTRAVASCULAR
Status: COMPLETED | OUTPATIENT
Start: 2025-01-15 | End: 2025-01-15

## 2025-01-15 RX ADMIN — IOPAMIDOL 100 ML: 755 INJECTION, SOLUTION INTRAVENOUS at 14:41

## 2025-03-07 ENCOUNTER — TRANSCRIBE ORDERS (OUTPATIENT)
Facility: HOSPITAL | Age: 80
End: 2025-03-07

## 2025-03-07 DIAGNOSIS — C81.70 HODGKIN'S PARAGRANULOMA (HCC): Primary | ICD-10-CM

## 2025-03-07 DIAGNOSIS — C81.08 NODULAR LYMPHOCYTE PREDOMINANT HODGKIN LYMPHOMA OF LYMPH NODES OF MULTIPLE SITES (HCC): ICD-10-CM

## 2025-04-14 ENCOUNTER — HOSPITAL ENCOUNTER (OUTPATIENT)
Facility: HOSPITAL | Age: 80
Discharge: HOME | End: 2025-04-17
Attending: INTERNAL MEDICINE
Payer: MEDICARE

## 2025-04-14 DIAGNOSIS — C81.70 HODGKIN'S PARAGRANULOMA (HCC): ICD-10-CM

## 2025-04-14 DIAGNOSIS — C81.08 NODULAR LYMPHOCYTE PREDOMINANT HODGKIN LYMPHOMA OF LYMPH NODES OF MULTIPLE SITES (HCC): ICD-10-CM

## 2025-04-14 PROCEDURE — 74177 CT ABD & PELVIS W/CONTRAST: CPT

## 2025-04-14 PROCEDURE — 6360000004 HC RX CONTRAST MEDICATION: Performed by: INTERNAL MEDICINE

## 2025-04-14 RX ORDER — IOPAMIDOL 755 MG/ML
100 INJECTION, SOLUTION INTRAVASCULAR
Status: COMPLETED | OUTPATIENT
Start: 2025-04-14 | End: 2025-04-14

## 2025-04-14 RX ADMIN — IOPAMIDOL 100 ML: 755 INJECTION, SOLUTION INTRAVENOUS at 14:37

## 2025-05-31 ENCOUNTER — TRANSCRIBE ORDERS (OUTPATIENT)
Facility: HOSPITAL | Age: 80
End: 2025-05-31

## 2025-05-31 DIAGNOSIS — C81.70 OTHER HODGKIN LYMPHOMA, UNSPECIFIED SITE (HCC): Primary | ICD-10-CM

## 2025-05-31 DIAGNOSIS — C81.08 NODULAR LYMPHOCYTE PREDOMINANT HODGKIN LYMPHOMA OF LYMPH NODES OF MULTIPLE SITES (HCC): ICD-10-CM

## 2025-06-01 ENCOUNTER — TRANSCRIBE ORDERS (OUTPATIENT)
Facility: HOSPITAL | Age: 80
End: 2025-06-01

## 2025-06-01 DIAGNOSIS — C81.70 HODGKIN'S PARAGRANULOMA (HCC): Primary | ICD-10-CM

## 2025-06-01 DIAGNOSIS — C81.08 NODULAR LYMPHOCYTE PREDOMINANT HODGKIN LYMPHOMA OF LYMPH NODES OF MULTIPLE SITES (HCC): ICD-10-CM

## 2025-06-25 ENCOUNTER — HOSPITAL ENCOUNTER (OUTPATIENT)
Facility: HOSPITAL | Age: 80
Discharge: HOME | End: 2025-06-28
Attending: INTERNAL MEDICINE
Payer: MEDICARE

## 2025-06-25 ENCOUNTER — APPOINTMENT (OUTPATIENT)
Facility: HOSPITAL | Age: 80
End: 2025-06-25
Attending: INTERNAL MEDICINE
Payer: MEDICARE

## 2025-06-25 DIAGNOSIS — C81.08 NODULAR LYMPHOCYTE PREDOMINANT HODGKIN LYMPHOMA OF LYMPH NODES OF MULTIPLE SITES (HCC): ICD-10-CM

## 2025-06-25 DIAGNOSIS — C81.70 OTHER HODGKIN LYMPHOMA, UNSPECIFIED SITE (HCC): ICD-10-CM

## 2025-06-25 LAB — CREAT BLD-MCNC: 1 MG/DL (ref 0.6–1.3)

## 2025-06-25 PROCEDURE — 82565 ASSAY OF CREATININE: CPT

## 2025-06-25 PROCEDURE — 6360000004 HC RX CONTRAST MEDICATION: Performed by: INTERNAL MEDICINE

## 2025-06-25 PROCEDURE — 74177 CT ABD & PELVIS W/CONTRAST: CPT

## 2025-06-25 RX ORDER — DIATRIZOATE MEGLUMINE AND DIATRIZOATE SODIUM 660; 100 MG/ML; MG/ML
30 SOLUTION ORAL; RECTAL
Status: DISCONTINUED | OUTPATIENT
Start: 2025-06-25 | End: 2025-06-29 | Stop reason: HOSPADM

## 2025-06-25 RX ORDER — IOPAMIDOL 755 MG/ML
100 INJECTION, SOLUTION INTRAVASCULAR
Status: COMPLETED | OUTPATIENT
Start: 2025-06-25 | End: 2025-06-25

## 2025-06-25 RX ADMIN — DIATRIZOATE MEGLUMINE AND DIATRIZOATE SODIUM 30 ML: 660; 100 LIQUID ORAL; RECTAL at 15:51

## 2025-06-25 RX ADMIN — IOPAMIDOL 100 ML: 755 INJECTION, SOLUTION INTRAVENOUS at 15:51

## 2025-08-26 ENCOUNTER — TRANSCRIBE ORDERS (OUTPATIENT)
Facility: HOSPITAL | Age: 80
End: 2025-08-26

## 2025-08-26 DIAGNOSIS — C81.70 HODGKIN'S PARAGRANULOMA (HCC): Primary | ICD-10-CM

## 2025-08-26 DIAGNOSIS — C81.08 NODULAR LYMPHOCYTE PREDOMINANT HODGKIN LYMPHOMA OF LYMPH NODES OF MULTIPLE SITES (HCC): ICD-10-CM

## (undated) DEVICE — TUBING SUCT 10FR MAL ALUM SHFT FN CAP VENT UNIV CONN W/ OBT

## (undated) DEVICE — SUTURE ETHILON SZ 3-0 L18IN NONABSORBABLE BLK L24MM FS-1 3/8 663G

## (undated) DEVICE — BASIC SINGLE BASIN-LF: Brand: MEDLINE INDUSTRIES, INC.

## (undated) DEVICE — T-DRAPE,EXTREMITY,STERILE: Brand: MEDLINE

## (undated) DEVICE — BIT DRL DIA3.5MM TRIM-IT

## (undated) DEVICE — PREP PAD BNS: Brand: CONVERTORS

## (undated) DEVICE — PENCIL ES CRD L10FT HND SWCHING ROCK SWCH W/ EDGE COAT BLDE

## (undated) DEVICE — SUTURE VICRYL + SZ 3-0 L36IN ABSRB UD L36MM CT-1 1/2 CIR VCP944H

## (undated) DEVICE — PADDING CAST 4 INX5 YD STRL

## (undated) DEVICE — GAUZE,SPONGE,4"X4",16PLY,STRL,LF,10/TRAY: Brand: MEDLINE

## (undated) DEVICE — DRAPE EQUIP C ARM 74X42 IN MOB XR W/ TIE RUBBER BND LF

## (undated) DEVICE — SOLUTION IRRIG 1000ML 0.9% SOD CHL USP POUR PLAS BTL

## (undated) DEVICE — UPPER EXTREMITY: Brand: MEDLINE INDUSTRIES, INC.

## (undated) DEVICE — BANDAGE,ELASTIC,ESMARK,STERILE,4"X9',LF: Brand: MEDLINE

## (undated) DEVICE — PAD,ABDOMINAL,8"X7.5",STERILE,LF,1/PK: Brand: MEDLINE

## (undated) DEVICE — GLOVE ORANGE PI 7   MSG9070

## (undated) DEVICE — DRAPE,REIN 53X77,STERILE: Brand: MEDLINE

## (undated) DEVICE — MINOR GENERAL: Brand: MEDLINE INDUSTRIES, INC.

## (undated) DEVICE — Device: Brand: JELCO

## (undated) DEVICE — GLOVE SURG SZ 8 CRM LTX FREE POLYISOPRENE POLYMER BEAD ANTI

## (undated) DEVICE — GOWN SURG XL 56.5 IN AAMI LEVEL 3 ORBIS

## (undated) DEVICE — ZIMMER® STERILE DISPOSABLE TOURNIQUET CUFF WITH PLC, DUAL PORT, SINGLE BLADDER, 34 IN. (86 CM)

## (undated) DEVICE — BIT DRL DIA2MM CALIB FOR ANK FRAC MGMT SYS

## (undated) DEVICE — BANDAGE E SELF CLSR 4INX5YD VELC SWIFTWRAP

## (undated) DEVICE — HYPODERMIC SAFETY NEEDLE: Brand: MONOJECT

## (undated) DEVICE — SCREW BNE L16MM DIA3MM CORT FT ANK TI SELF DRL SLD FULL
Type: IMPLANTABLE DEVICE | Site: ANKLE | Status: NON-FUNCTIONAL
Removed: 2024-07-08

## (undated) DEVICE — PADDING CAST W6INXL4YD ST COT RAYON MICROPLEATED HIGHLY

## (undated) DEVICE — GLOVE SURG SZ 8 L12IN FNGR THK79MIL GRN LTX FREE

## (undated) DEVICE — SPLINT ORTH DBL SIDE 15 FTX5 IN RL PADDED FIBERGLASS LF

## (undated) DEVICE — TURNOVER KIT A GEN SURG

## (undated) DEVICE — SUTURE VICRYL + SZ 2-0 L27IN ABSRB UD CT-2 L26MM 1/2 CIR TAPR VCP269H

## (undated) DEVICE — GLOVE SURG SZ 6 THK91MIL LTX FREE SYN POLYISOPRENE ANTI

## (undated) DEVICE — SPONGE GZ W4XL4IN COT 12 PLY TYP VII WVN C FLD DSGN STERILE

## (undated) DEVICE — BANDAGE COMPR W6INXL5YD WHT BGE POLY COT M E WRP WV HK AND

## (undated) DEVICE — PENCIL ES L3M BTTN SWCH S STL HEX LOK BLDE ELECTRD HOLSTER

## (undated) DEVICE — DRESSING,GAUZE,XEROFORM,CURAD,1"X8",ST: Brand: CURAD

## (undated) DEVICE — BLADE,CARBON-STEEL,15,STRL,DISPOSABLE,TB: Brand: MEDLINE

## (undated) DEVICE — SUTURE VICRYL + SZ 0 L27IN ABSRB WHT CT-2 1/2 CIR TAPERPOINT VCP270H

## (undated) DEVICE — SOLUTION IRRIG 1000ML STRL H2O USP PLAS POUR BTL

## (undated) DEVICE — GLOVE SURG SZ 7 L12IN FNGR THK79MIL GRN LTX FREE

## (undated) DEVICE — SUTURE MONOCRYL + SZ 4-0 L27IN ABSRB UD L19MM PS-2 3/8 CIR MCP426H

## (undated) DEVICE — SUTURE VICRYL + SZ 2-0 L36IN ABSRB UD L36MM CT-1 1/2 CIR VCP945H

## (undated) DEVICE — 3M™ COBAN™ NL STERILE NON-LATEX SELF-ADHERENT WRAP, 2084S, 4 IN X 5 YD (10 CM X 4,5 M), 18 ROLLS/CASE: Brand: 3M™ COBAN™

## (undated) DEVICE — BANDAGE COMPR W4INXL5YD WHT BGE POLY COT M E WRP WV HK AND

## (undated) DEVICE — BANDAGE COMPR M W6INXL10YD WHT BGE VELC E MTRX HK AND LOOP

## (undated) DEVICE — GARMENT,MEDLINE,DVT,INT,CALF,MED, GEN2: Brand: MEDLINE

## (undated) DEVICE — C-ARMOR C-ARM EQUIPMENT COVERS CLEAR STERILE UNIVERSAL FIT 12 PER CASE: Brand: C-ARMOR

## (undated) DEVICE — SOLUTION IRRIG 500ML 0.9% SOD CHLO USP POUR PLAS BTL

## (undated) DEVICE — THE STERILE LIGHT HANDLE COVER IS USED WITH STERIS SURGICAL LIGHTING AND VISUALIZATION SYSTEMS.

## (undated) DEVICE — GLOVE SURG SZ 65 L12IN FNGR THK79MIL GRN LTX FREE

## (undated) DEVICE — BIT DRL DIA2.5MM FOR ANK FRAC MGMT SYS

## (undated) DEVICE — ELECTRODE PT RET AD L9FT HI MOIST COND ADH HYDRGEL CORDED

## (undated) DEVICE — PADDING UNDERCAST W4INXL12FT RAYON POLY SYN NONADHESIVE

## (undated) DEVICE — SUTURE VICRYL + SZ 3-0 L27IN ABSRB UD L26MM SH 1/2 CIR VCP416H

## (undated) DEVICE — DRESSING,GAUZE,XEROFORM,CURAD,5"X9",ST: Brand: CURAD

## (undated) DEVICE — SYRINGE MED 10ML LUERLOCK TIP W/O SFTY DISP

## (undated) DEVICE — TOWEL,OR,DSP,ST,BLUE,DLX,1/PK,80PK/CS: Brand: MEDLINE

## (undated) DEVICE — SYRINGE IRRIG 60ML SFT PLIABLE BLB EZ TO GRP 1 HND USE W/

## (undated) DEVICE — GLOVE SURG SZ 75 CRM LTX FREE POLYISOPRENE POLYMER BEAD ANTI